# Patient Record
Sex: FEMALE | Race: BLACK OR AFRICAN AMERICAN | Employment: FULL TIME | URBAN - METROPOLITAN AREA
[De-identification: names, ages, dates, MRNs, and addresses within clinical notes are randomized per-mention and may not be internally consistent; named-entity substitution may affect disease eponyms.]

---

## 2017-06-22 ENCOUNTER — OFFICE VISIT (OUTPATIENT)
Dept: FAMILY MEDICINE CLINIC | Age: 47
End: 2017-06-22

## 2017-06-22 ENCOUNTER — HOSPITAL ENCOUNTER (OUTPATIENT)
Dept: LAB | Age: 47
Discharge: HOME | End: 2017-06-22

## 2017-06-22 VITALS
OXYGEN SATURATION: 98 % | TEMPERATURE: 98 F | HEART RATE: 91 BPM | BODY MASS INDEX: 41.15 KG/M2 | WEIGHT: 241 LBS | RESPIRATION RATE: 18 BRPM | SYSTOLIC BLOOD PRESSURE: 132 MMHG | DIASTOLIC BLOOD PRESSURE: 82 MMHG | HEIGHT: 64 IN

## 2017-06-22 DIAGNOSIS — J45.51 SEVERE PERSISTENT ASTHMA WITH ACUTE EXACERBATION: Primary | ICD-10-CM

## 2017-06-22 DIAGNOSIS — J30.89 ALLERGIC RHINITIS DUE TO OTHER ALLERGIC TRIGGER, UNSPECIFIED RHINITIS SEASONALITY: ICD-10-CM

## 2017-06-22 DIAGNOSIS — Z00.00 ROUTINE PHYSICAL EXAMINATION: ICD-10-CM

## 2017-06-22 LAB
ALBUMIN SERPL BCP-MCNC: 3.6 G/DL (ref 3.4–5)
ALBUMIN/GLOB SERPL: 0.8 {RATIO} (ref 0.8–1.7)
ALP SERPL-CCNC: 125 U/L (ref 45–117)
ALT SERPL-CCNC: 20 U/L (ref 13–56)
ANION GAP BLD CALC-SCNC: 11 MMOL/L (ref 3–18)
AST SERPL W P-5'-P-CCNC: 11 U/L (ref 15–37)
BASOPHILS # BLD AUTO: 0 K/UL (ref 0–0.06)
BASOPHILS # BLD: 0 % (ref 0–2)
BILIRUB SERPL-MCNC: 0.4 MG/DL (ref 0.2–1)
BUN SERPL-MCNC: 6 MG/DL (ref 7–18)
BUN/CREAT SERPL: 7 (ref 12–20)
CALCIUM SERPL-MCNC: 9.1 MG/DL (ref 8.5–10.1)
CHLORIDE SERPL-SCNC: 99 MMOL/L (ref 100–108)
CHOLEST SERPL-MCNC: 213 MG/DL
CO2 SERPL-SCNC: 25 MMOL/L (ref 21–32)
CREAT SERPL-MCNC: 0.89 MG/DL (ref 0.6–1.3)
DIFFERENTIAL METHOD BLD: ABNORMAL
EOSINOPHIL # BLD: 0.7 K/UL (ref 0–0.4)
EOSINOPHIL NFR BLD: 4 % (ref 0–5)
ERYTHROCYTE [DISTWIDTH] IN BLOOD BY AUTOMATED COUNT: 15.2 % (ref 11.6–14.5)
EST. AVERAGE GLUCOSE BLD GHB EST-MCNC: 217 MG/DL
GLOBULIN SER CALC-MCNC: 4.7 G/DL (ref 2–4)
GLUCOSE SERPL-MCNC: 289 MG/DL (ref 74–99)
HBA1C MFR BLD: 9.2 % (ref 4.2–5.6)
HCT VFR BLD AUTO: 38.2 % (ref 35–45)
HDLC SERPL-MCNC: 52 MG/DL (ref 40–60)
HDLC SERPL: 4.1 {RATIO} (ref 0–5)
HGB BLD-MCNC: 12.5 G/DL (ref 12–16)
LDLC SERPL CALC-MCNC: 129 MG/DL (ref 0–100)
LIPID PROFILE,FLP: ABNORMAL
LYMPHOCYTES # BLD AUTO: 17 % (ref 21–52)
LYMPHOCYTES # BLD: 2.8 K/UL (ref 0.9–3.6)
MCH RBC QN AUTO: 24.5 PG (ref 24–34)
MCHC RBC AUTO-ENTMCNC: 32.7 G/DL (ref 31–37)
MCV RBC AUTO: 74.9 FL (ref 74–97)
MONOCYTES # BLD: 1.1 K/UL (ref 0.05–1.2)
MONOCYTES NFR BLD AUTO: 7 % (ref 3–10)
NEUTS SEG # BLD: 11.7 K/UL (ref 1.8–8)
NEUTS SEG NFR BLD AUTO: 72 % (ref 40–73)
PLATELET # BLD AUTO: 329 K/UL (ref 135–420)
PMV BLD AUTO: 12.1 FL (ref 9.2–11.8)
POTASSIUM SERPL-SCNC: 4.1 MMOL/L (ref 3.5–5.5)
PROT SERPL-MCNC: 8.3 G/DL (ref 6.4–8.2)
RBC # BLD AUTO: 5.1 M/UL (ref 4.2–5.3)
SODIUM SERPL-SCNC: 135 MMOL/L (ref 136–145)
TRIGL SERPL-MCNC: 160 MG/DL (ref ?–150)
TSH SERPL DL<=0.05 MIU/L-ACNC: 2.65 UIU/ML (ref 0.36–3.74)
VLDLC SERPL CALC-MCNC: 32 MG/DL
WBC # BLD AUTO: 16.4 K/UL (ref 4.6–13.2)

## 2017-06-22 PROCEDURE — 85025 COMPLETE CBC W/AUTO DIFF WBC: CPT | Performed by: NURSE PRACTITIONER

## 2017-06-22 PROCEDURE — 80061 LIPID PANEL: CPT | Performed by: NURSE PRACTITIONER

## 2017-06-22 PROCEDURE — 80053 COMPREHEN METABOLIC PANEL: CPT | Performed by: NURSE PRACTITIONER

## 2017-06-22 PROCEDURE — 83036 HEMOGLOBIN GLYCOSYLATED A1C: CPT | Performed by: NURSE PRACTITIONER

## 2017-06-22 PROCEDURE — 84443 ASSAY THYROID STIM HORMONE: CPT | Performed by: NURSE PRACTITIONER

## 2017-06-22 RX ORDER — ALBUTEROL SULFATE 0.83 MG/ML
1.25 SOLUTION RESPIRATORY (INHALATION)
Qty: 24 EACH | Refills: 5 | Status: SHIPPED | OUTPATIENT
Start: 2017-06-22

## 2017-06-22 RX ORDER — AZITHROMYCIN 250 MG/1
TABLET, FILM COATED ORAL
Qty: 6 TAB | Refills: 0 | Status: SHIPPED | OUTPATIENT
Start: 2017-06-22 | End: 2017-06-27

## 2017-06-22 RX ORDER — ALBUTEROL SULFATE 90 UG/1
2 AEROSOL, METERED RESPIRATORY (INHALATION)
Qty: 1 INHALER | Refills: 0 | Status: SHIPPED | COMMUNITY
Start: 2017-06-22 | End: 2017-11-08 | Stop reason: SDUPTHER

## 2017-06-22 RX ORDER — FLUTICASONE PROPIONATE 50 MCG
SPRAY, SUSPENSION (ML) NASAL
Qty: 1 BOTTLE | Refills: 0 | Status: SHIPPED | COMMUNITY
Start: 2017-06-22 | End: 2017-11-08 | Stop reason: ALTCHOICE

## 2017-06-22 RX ORDER — MONTELUKAST SODIUM 10 MG/1
10 TABLET ORAL DAILY
Qty: 30 TAB | Refills: 11 | Status: SHIPPED | OUTPATIENT
Start: 2017-06-22 | End: 2018-10-30 | Stop reason: SDUPTHER

## 2017-06-22 RX ORDER — METHYLPREDNISOLONE 4 MG/1
TABLET ORAL
Qty: 1 DOSE PACK | Refills: 0 | Status: SHIPPED | COMMUNITY
Start: 2017-06-22 | End: 2022-10-04 | Stop reason: ALTCHOICE

## 2017-06-22 NOTE — MR AVS SNAPSHOT
Visit Information Date & Time Provider Department Dept. Phone Encounter #  
 6/22/2017  9:00 AM Fuad Maxwell Ra [id number] Your Appointments 7/24/2017  2:45 PM  
Follow Up with Fuad Maxwell  [address] ([facility]) Appt Note: f/u appointment [address] [facility] [address] [place]  
  
   
 [address] [facility] [address] [id number] Upcoming Health Maintenance Date Due Pneumococcal 19-64 Medium Risk (1 of 1 - PPSV23) 8/19/1991 DTaP/Tdap/Td series (1 - Tdap) 8/19/1993 INFLUENZA AGE 9 TO ADULT 8/1/2017 PAP AKA CERVICAL CYTOLOGY 3/22/2019 Allergies as of 6/22/2017  Review Complete On: 6/22/2017 By: Samy Santamaria Severity Noted Reaction Type Reactions Pcn [Penicillins]  01/20/2012    Hives Current Immunizations  Never Reviewed No immunizations on file. Not reviewed this visit You Were Diagnosed With   
  
 Codes Comments Severe persistent asthma with acute exacerbation    -  Primary ICD-10-CM: J45.51 
ICD-9-CM: 493.92 Allergic rhinitis due to other allergic trigger, unspecified rhinitis seasonality     ICD-10-CM: J30.89 ICD-9-CM: 477.8 Routine physical examination     ICD-10-CM: Z00.00 ICD-9-CM: V70.0 Vitals BP Pulse Temp Resp Height(growth percentile) Weight(growth percentile) 132/82 (BP 1 Location: Left arm, BP Patient Position: Sitting) 91 98 °F (36.7 °C) (Oral) 18 5' 4\" (1.626 m) 241 lb (109.3 kg) LMP SpO2 BMI OB Status Smoking Status 05/28/2017 98% 41.37 kg/m2 Having regular periods Never Smoker Vitals History BMI and BSA Data Body Mass Index Body Surface Area  
 41.37 kg/m 2 2.22 m 2 Preferred Pharmacy Pharmacy Name Phone Irasema Gonzalez [facility], [address] [phone] Your Updated Medication List  
  
   
 This list is accurate as of: 6/22/17  9:55 AM.  Always use your most recent med list.  
  
  
  
  
 ADVAIR DISKUS 250-50 mcg/dose diskus inhaler Generic drug:  fluticasone-salmeterol Take 1 Puff by inhalation every twelve (12) hours. * albuterol 2.5 mg /3 mL (0.083 %) nebulizer solution Commonly known as:  PROVENTIL VENTOLIN  
1.5 mL by Nebulization route every four (4) hours as needed for Wheezing. * albuterol 90 mcg/actuation inhaler Commonly known as:  PROVENTIL HFA, VENTOLIN HFA, PROAIR HFA Take 2 Puffs by inhalation every four (4) hours as needed for Wheezing. * albuterol 90 mcg/actuation inhaler Commonly known as:  PROVENTIL HFA, VENTOLIN HFA, PROAIR HFA Take 1 Puff by inhalation. azithromycin 250 mg tablet Commonly known as:  Devora Royal Take 2 tablets today, then take 1 tablet daily  
  
 fluticasone 50 mcg/actuation nasal spray Commonly known as:  FLONASE  
1 spray each nostril daily  
  
 methylPREDNISolone 4 mg tablet Commonly known as:  Tilman Blew Use as directed * montelukast 10 mg tablet Commonly known as:  SINGULAIR Take 1 Tab by mouth daily. * SINGULAIR 10 mg tablet Generic drug:  montelukast  
Take 10 mg by mouth daily. * Notice: This list has 5 medication(s) that are the same as other medications prescribed for you. Read the directions carefully, and ask your doctor or other care provider to review them with you. Prescriptions Printed Refills  
 azithromycin (ZITHROMAX) 250 mg tablet 0 Sig: Take 2 tablets today, then take 1 tablet daily Class: Print Prescriptions Sent to Pharmacy Refills  
 montelukast (SINGULAIR) 10 mg tablet 11 Sig: Take 1 Tab by mouth daily. Class: Normal  
 Pharmacy: Nancy Duarte [address] [place] [address], [address] [place] [address] Ph #: [phone]  Route: Oral  
 albuterol (PROVENTIL VENTOLIN) 2.5 mg /3 mL (0.083 %) nebulizer solution 5  
 Si.5 mL by Nebulization route every four (4) hours as needed for Wheezing. Class: Normal  
 Pharmacy: Katarzyna Gustafson [address] [facility], [address] [place] [address] #: [phone] Route: Nebulization Patient Instructions The doctor would like you to complete your mammogram and pap screening exams. You may contact [facility] for a  Free Mammogram and Pap Smear. To schedule an appointment call  [phone]  and you will be referred to the [facility] [address] [facility]  nearest you. At your appointment provide the  [address] [place] [address] as your primary care physician and they will forward your test results for inclusion in your medical records Introducing [facility]! Dear Jose Parrish: 
Thank you for requesting a [employer] account. Our records indicate that you already have an active [employer] account. You can access your account anytime at [email or web address] Did you know that you can access your hospital and ER discharge instructions at any time in [employer]? You can also review all of your test results from your hospital stay or ER visit. Additional Information If you have questions, please visit the Frequently Asked Questions section of the [employer] website at [email or web address] Remember, [employer] is NOT to be used for urgent needs. For medical emergencies, dial 911. Now available from your iPhone and Android! Please provide this summary of care documentation to your next provider. Your primary care clinician is listed as [address]. If you have any questions after today's visit, please call [phone].

## 2017-06-22 NOTE — PATIENT INSTRUCTIONS
The doctor would like you to complete your mammogram and pap screening exams. You may contact [facility] for a  Free Mammogram and Pap Smear. To schedule an appointment call  [phone]  and you will be referred to the [facility] [address] [facility]  nearest you.     At your appointment provide the  [address] [place] [address] as your primary care physician and they will forward your test results for inclusion in your medical records

## 2017-06-22 NOTE — PROGRESS NOTES
No chief complaint on file. 1. When and where did you last receive medical care? 3/22/16 [facility]. Dr. Mckayla Sifuentes    2. When and where did you last have preventive care such as mammogram, pap smears or colon screening? 3/22/2016 PAP and MAMMO -Colon Screening 12/2011    3. What is your current living situation (for example, live alone, live in home with immediate family members)? Lives with mother    4. Do you have any problems with communication such trouble seeing, hearing, or understanding instructions? No    5. Do you have an advance directive? This is a document that you can give to family members with instructions for how you would want them to make health care decisions for you if you were unable to speak for yourself. (For example, unconscious, delerious)No    PMH/FH/Social Hx reviewed and updated as needed     Applicable screenings reviewed and updated as needed  Medication reconciliation performed. Patient does not know need medication refills. Health Maintenance reviewed.

## 2017-06-22 NOTE — PROGRESS NOTES
Gave AVs, sample albuterol, flonase and prednisone. Given PAP application for Albuterol, veramyst and advair Discharge instructions reviewed with patient    Medication list and understanding of medications reviewed with patient. OTC and herbal medications reviewed and added to med list if applicable  Barriers to adherence assessed. Guidance given regarding new medications this visit, including reason for taking this medicine, and common side effects. Labs drawn.

## 2017-06-22 NOTE — PROGRESS NOTES
ROMAIN Hall is a 42 y.o. female being seen today for to establish care. .  she states that she has been coughing a lot and feels much better when she takes advair, singular and albuterol. Doesn't feel unwell but coughing thick mucous throughout the day. Past Medical History:   Diagnosis Date    Asthma          ROS  Patient states that she is feeling well. Denies complaints of chest pain, shortness of breath, swelling of legs, dizziness or weakness. she denies nausea, vomiting or diarrhea. Current Outpatient Prescriptions   Medication Sig    azithromycin (ZITHROMAX) 250 mg tablet Take 2 tablets today, then take 1 tablet daily    montelukast (SINGULAIR) 10 mg tablet Take 1 Tab by mouth daily.  albuterol (PROVENTIL VENTOLIN) 2.5 mg /3 mL (0.083 %) nebulizer solution 1.5 mL by Nebulization route every four (4) hours as needed for Wheezing.  fluticasone (FLONASE) 50 mcg/actuation nasal spray 1 spray each nostril daily    albuterol (PROVENTIL HFA, VENTOLIN HFA, PROAIR HFA) 90 mcg/actuation inhaler Take 2 Puffs by inhalation every four (4) hours as needed for Wheezing.  methylPREDNISolone (MEDROL DOSEPACK) 4 mg tablet Use as directed    montelukast (SINGULAIR) 10 mg tablet Take 10 mg by mouth daily.  albuterol (PROVENTIL HFA, VENTOLIN HFA) 90 mcg/Actuation inhaler Take 1 Puff by inhalation.  fluticasone-salmeterol (ADVAIR DISKUS) 250-50 mcg/dose diskus inhaler Take 1 Puff by inhalation every twelve (12) hours. No current facility-administered medications for this visit. PE  Visit Vitals    /82 (BP 1 Location: Left arm, BP Patient Position: Sitting)    Pulse 91    Temp 98 °F (36.7 °C) (Oral)    Resp 18    Ht 5' 4\" (1.626 m)    Wt 241 lb (109.3 kg)    LMP 05/28/2017    SpO2 98%    BMI 41.37 kg/m2        Alert and oriented with normal mood and affect. she is well developed and well nourished . Lungs with wheezing throughout and upper airway congestion.  Heart rate is regular without murmurs or gallops. There is no lower extremity edema. Assessment and Plan:        ICD-10-CM ICD-9-CM    1. Severe persistent asthma with acute exacerbation J45.51 493.92    2. Allergic rhinitis due to other allergic trigger, unspecified rhinitis seasonality J30.89 477.8    3.  Routine physical examination Z00.00 V70.0 CBC WITH AUTOMATED DIFF      METABOLIC PANEL, COMPREHENSIVE      HEMOGLOBIN A1C WITH EAG      TSH 3RD GENERATION      LIPID PANEL   labs   PAP for advair, albuterol and singulair  F/u prn no improvement        Jayde García, SEMAJ

## 2017-06-29 ENCOUNTER — HOSPITAL ENCOUNTER (OUTPATIENT)
Dept: GENERAL RADIOLOGY | Age: 47
Discharge: HOME | End: 2017-06-29

## 2017-06-29 ENCOUNTER — TELEPHONE (OUTPATIENT)
Dept: FAMILY MEDICINE CLINIC | Age: 47
End: 2017-06-29

## 2017-06-29 DIAGNOSIS — J30.89 ALLERGIC RHINITIS DUE TO OTHER ALLERGIC TRIGGER, UNSPECIFIED RHINITIS SEASONALITY: ICD-10-CM

## 2017-06-29 DIAGNOSIS — J45.51 SEVERE PERSISTENT ASTHMA WITH ACUTE EXACERBATION: ICD-10-CM

## 2017-06-29 PROCEDURE — 71020 XR CHEST PA LAT: CPT

## 2017-06-29 NOTE — TELEPHONE ENCOUNTER
Called pt to let her know that her WBC was elevated and asked if she had any known blood disorders. She said no. Due to her asthma I requested a chest xray and advised that if it came back normal we should recheck the labs in 4-6 weeks and can check her urine at that time. Pt expressed understanding to walk in for chest xray and will follow up if any new symptoms or whatever is suggested by the practitioner.

## 2017-07-24 ENCOUNTER — HOSPITAL ENCOUNTER (OUTPATIENT)
Dept: LAB | Age: 47
Discharge: HOME | End: 2017-07-24

## 2017-07-24 ENCOUNTER — OFFICE VISIT (OUTPATIENT)
Dept: FAMILY MEDICINE CLINIC | Age: 47
End: 2017-07-24

## 2017-07-24 VITALS
DIASTOLIC BLOOD PRESSURE: 84 MMHG | OXYGEN SATURATION: 100 % | TEMPERATURE: 97.9 F | HEIGHT: 64 IN | HEART RATE: 83 BPM | BODY MASS INDEX: 41.83 KG/M2 | RESPIRATION RATE: 16 BRPM | WEIGHT: 245 LBS | SYSTOLIC BLOOD PRESSURE: 120 MMHG

## 2017-07-24 DIAGNOSIS — J45.30 MILD PERSISTENT ASTHMA WITHOUT COMPLICATION: ICD-10-CM

## 2017-07-24 DIAGNOSIS — D72.829 LEUKOCYTOSIS, UNSPECIFIED TYPE: ICD-10-CM

## 2017-07-24 DIAGNOSIS — D72.829 LEUKOCYTOSIS, UNSPECIFIED TYPE: Primary | ICD-10-CM

## 2017-07-24 LAB
BASOPHILS # BLD AUTO: 0 K/UL (ref 0–0.06)
BASOPHILS # BLD: 0 % (ref 0–2)
DIFFERENTIAL METHOD BLD: ABNORMAL
EOSINOPHIL # BLD: 0.6 K/UL (ref 0–0.4)
EOSINOPHIL NFR BLD: 4 % (ref 0–5)
ERYTHROCYTE [DISTWIDTH] IN BLOOD BY AUTOMATED COUNT: 15.4 % (ref 11.6–14.5)
HCT VFR BLD AUTO: 37 % (ref 35–45)
HGB BLD-MCNC: 12.1 G/DL (ref 12–16)
LYMPHOCYTES # BLD AUTO: 27 % (ref 21–52)
LYMPHOCYTES # BLD: 3.7 K/UL (ref 0.9–3.6)
MCH RBC QN AUTO: 24.8 PG (ref 24–34)
MCHC RBC AUTO-ENTMCNC: 32.7 G/DL (ref 31–37)
MCV RBC AUTO: 76 FL (ref 74–97)
MONOCYTES # BLD: 0.8 K/UL (ref 0.05–1.2)
MONOCYTES NFR BLD AUTO: 6 % (ref 3–10)
NEUTS SEG # BLD: 8.8 K/UL (ref 1.8–8)
NEUTS SEG NFR BLD AUTO: 63 % (ref 40–73)
PLATELET # BLD AUTO: 352 K/UL (ref 135–420)
PMV BLD AUTO: 10.9 FL (ref 9.2–11.8)
RBC # BLD AUTO: 4.87 M/UL (ref 4.2–5.3)
WBC # BLD AUTO: 13.9 K/UL (ref 4.6–13.2)

## 2017-07-24 PROCEDURE — 85025 COMPLETE CBC W/AUTO DIFF WBC: CPT | Performed by: NURSE PRACTITIONER

## 2017-07-24 NOTE — PROGRESS NOTES
Discharge instructions reviewed with patient    Medication list and understanding of medications reviewed with patient. OTC and herbal medications reviewed and added to med list if applicable  Barriers to adherence assessed. Guidance given regarding new medications this visit, including reason for taking this medicine, and common side effects. Given PAP application for Albuterol, Advair and Beconase  With instruction. Given AVs after labs drawn.

## 2017-07-24 NOTE — MR AVS SNAPSHOT
Visit Information Date & Time Provider Department Dept. Phone Encounter #  
 7/24/2017  2:45 PM Marty Evans, [address] [facility] [place] [phone] [id number] Upcoming Health Maintenance Date Due Pneumococcal 19-64 Medium Risk (1 of 1 - PPSV23) 8/19/1991 DTaP/Tdap/Td series (1 - Tdap) 8/19/1993 INFLUENZA AGE 9 TO ADULT 8/1/2017 PAP AKA CERVICAL CYTOLOGY 3/22/2019 Allergies as of 7/24/2017  Review Complete On: 7/24/2017 By: Martin Evangelista Severity Noted Reaction Type Reactions Pcn [Penicillins]  01/20/2012    Hives Current Immunizations  Never Reviewed No immunizations on file. Not reviewed this visit You Were Diagnosed With   
  
 Codes Comments Leukocytosis, unspecified type    -  Primary ICD-10-CM: D72.829 ICD-9-CM: 288.60 Vitals BP Pulse Temp Resp Height(growth percentile) Weight(growth percentile) 120/84 (BP 1 Location: Left arm, BP Patient Position: Sitting) 83 97.9 °F (36.6 °C) (Oral) 16 5' 4\" (1.626 m) 245 lb (111.1 kg) LMP SpO2 BMI OB Status Smoking Status 07/17/2017 (Exact Date) 100% 42.05 kg/m2 Having regular periods Never Smoker Vitals History BMI and BSA Data Body Mass Index Body Surface Area 42.05 kg/m 2 2.24 m 2 Preferred Pharmacy Pharmacy Name Phone Alison Bain [address] [facility], [address] [place] [address] [phone] Your Updated Medication List  
  
   
This list is accurate as of: 7/24/17  4:37 PM.  Always use your most recent med list.  
  
  
  
  
 Rona Frias 250-50 mcg/dose diskus inhaler Generic drug:  fluticasone-salmeterol Take 1 Puff by inhalation every twelve (12) hours. * albuterol 2.5 mg /3 mL (0.083 %) nebulizer solution Commonly known as:  PROVENTIL VENTOLIN  
1.5 mL by Nebulization route every four (4) hours as needed for Wheezing. * albuterol 90 mcg/actuation inhaler Commonly known as:  PROVENTIL HFA, VENTOLIN HFA, PROAIR HFA Take 2 Puffs by inhalation every four (4) hours as needed for Wheezing. * albuterol 90 mcg/actuation inhaler Commonly known as:  PROVENTIL HFA, VENTOLIN HFA, PROAIR HFA Take 1 Puff by inhalation. fluticasone 50 mcg/actuation nasal spray Commonly known as:  FLONASE  
1 spray each nostril daily  
  
 methylPREDNISolone 4 mg tablet Commonly known as:  Joselito Midget Use as directed * montelukast 10 mg tablet Commonly known as:  SINGULAIR Take 1 Tab by mouth daily. * SINGULAIR 10 mg tablet Generic drug:  montelukast  
Take 10 mg by mouth daily. * Notice: This list has 5 medication(s) that are the same as other medications prescribed for you. Read the directions carefully, and ask your doctor or other care provider to review them with you. Introducing [facility]! Dear Padmini Wheeler: 
Thank you for requesting a [employer] account. Our records indicate that you already have an active [employer] account. You can access your account anytime at [email or web address]. Did you know that you can access your hospital and ER discharge instructions at any time in [employer]? You can also review all of your test results from your hospital stay or ER visit. Additional Information If you have questions, please visit the Frequently Asked Questions section of the [employer] website at [email or web address]. Remember, [employer] is NOT to be used for urgent needs. For medical emergencies, dial 911. Now available from your iPhone and Android! Please provide this summary of care documentation to your next provider. Your primary care clinician is listed as [address]. If you have any questions after today's visit, please call [phone].

## 2017-07-24 NOTE — PROGRESS NOTES
No chief complaint on file. 1. Have you been to the ER, urgent care clinic since your last visit? Hospitalized since your last visit? No    2. Have you seen or consulted any other health care providers outside of the [address] [place] [address] since your last visit? No    3. When was your last Pap smear? current  When was your last Mammogram? 3/2016 current  When was your last Colon screening? Colon screening -12/2011-    PMH/FH/Social Hx reviewed and updated as needed      Applicable screenings reviewed and updated as needed  Medication reconciliation performed. Patient does not know need medication refills. Health Maintenance reviewed.

## 2017-07-24 NOTE — PROGRESS NOTES
HPI  Alysia Alanis is a 42 y.o. female being seen today for followup. Needs another pap application. Tried prednisone then started zpack when she was not feeling better. Did feel better after zpack. Chief Complaint   Patient presents with    Follow Up Chronic Condition     Asthma monitor and medicate symptoms - patient noted imrpovement   . she states that see HPI    Past Medical History:   Diagnosis Date    Asthma          ROS  Patient states that she is feeling well. Denies complaints of chest pain, shortness of breath, swelling of legs, dizziness or weakness. she denies nausea, vomiting or diarrhea. Current Outpatient Prescriptions   Medication Sig    montelukast (SINGULAIR) 10 mg tablet Take 1 Tab by mouth daily.  albuterol (PROVENTIL VENTOLIN) 2.5 mg /3 mL (0.083 %) nebulizer solution 1.5 mL by Nebulization route every four (4) hours as needed for Wheezing.  fluticasone (FLONASE) 50 mcg/actuation nasal spray 1 spray each nostril daily    albuterol (PROVENTIL HFA, VENTOLIN HFA, PROAIR HFA) 90 mcg/actuation inhaler Take 2 Puffs by inhalation every four (4) hours as needed for Wheezing.  methylPREDNISolone (MEDROL DOSEPACK) 4 mg tablet Use as directed    fluticasone-salmeterol (ADVAIR DISKUS) 250-50 mcg/dose diskus inhaler Take 1 Puff by inhalation every twelve (12) hours.  montelukast (SINGULAIR) 10 mg tablet Take 10 mg by mouth daily.  albuterol (PROVENTIL HFA, VENTOLIN HFA) 90 mcg/Actuation inhaler Take 1 Puff by inhalation. No current facility-administered medications for this visit. PE  Visit Vitals    /84 (BP 1 Location: Left arm, BP Patient Position: Sitting)    Pulse 83    Temp 97.9 °F (36.6 °C) (Oral)    Resp 16    Ht 5' 4\" (1.626 m)    Wt 245 lb (111.1 kg)    LMP 07/17/2017 (Exact Date)    SpO2 100%    BMI 42.05 kg/m2        Alert and oriented with normal mood and affect. she is well developed and well nourished .  Lungs are clear without wheezing. Heart rate is regular without murmurs or gallops. There is no lower extremity edema. Results for orders placed or performed during the hospital encounter of 06/22/17   CBC WITH AUTOMATED DIFF   Result Value Ref Range    WBC 16.4 (H) 4.6 - 13.2 K/uL    RBC 5.10 4.20 - 5.30 M/uL    HGB 12.5 12.0 - 16.0 g/dL    HCT 38.2 35.0 - 45.0 %    MCV 74.9 74.0 - 97.0 FL    MCH 24.5 24.0 - 34.0 PG    MCHC 32.7 31.0 - 37.0 g/dL    RDW 15.2 (H) 11.6 - 14.5 %    PLATELET 964 923 - 837 K/uL    MPV 12.1 (H) 9.2 - 11.8 FL    NEUTROPHILS 72 40 - 73 %    LYMPHOCYTES 17 (L) 21 - 52 %    MONOCYTES 7 3 - 10 %    EOSINOPHILS 4 0 - 5 %    BASOPHILS 0 0 - 2 %    ABS. NEUTROPHILS 11.7 (H) 1.8 - 8.0 K/UL    ABS. LYMPHOCYTES 2.8 0.9 - 3.6 K/UL    ABS. MONOCYTES 1.1 0.05 - 1.2 K/UL    ABS. EOSINOPHILS 0.7 (H) 0.0 - 0.4 K/UL    ABS. BASOPHILS 0.0 0.0 - 0.06 K/UL    DF AUTOMATED     METABOLIC PANEL, COMPREHENSIVE   Result Value Ref Range    Sodium 135 (L) 136 - 145 mmol/L    Potassium 4.1 3.5 - 5.5 mmol/L    Chloride 99 (L) 100 - 108 mmol/L    CO2 25 21 - 32 mmol/L    Anion gap 11 3.0 - 18 mmol/L    Glucose 289 (H) 74 - 99 mg/dL    BUN 6 (L) 7.0 - 18 MG/DL    Creatinine 0.89 0.6 - 1.3 MG/DL    BUN/Creatinine ratio 7 (L) 12 - 20      GFR est AA >60 >60 ml/min/1.73m2    GFR est non-AA >60 >60 ml/min/1.73m2    Calcium 9.1 8.5 - 10.1 MG/DL    Bilirubin, total 0.4 0.2 - 1.0 MG/DL    ALT (SGPT) 20 13 - 56 U/L    AST (SGOT) 11 (L) 15 - 37 U/L    Alk.  phosphatase 125 (H) 45 - 117 U/L    Protein, total 8.3 (H) 6.4 - 8.2 g/dL    Albumin 3.6 3.4 - 5.0 g/dL    Globulin 4.7 (H) 2.0 - 4.0 g/dL    A-G Ratio 0.8 0.8 - 1.7     HEMOGLOBIN A1C WITH EAG   Result Value Ref Range    Hemoglobin A1c 9.2 (H) 4.2 - 5.6 %    Est. average glucose 217 mg/dL   TSH 3RD GENERATION   Result Value Ref Range    TSH 2.65 0.36 - 3.74 uIU/mL   LIPID PANEL   Result Value Ref Range    LIPID PROFILE          Cholesterol, total 213 (H) <200 MG/DL    Triglyceride 160 (H) <150 MG/DL    HDL Cholesterol 52 40 - 60 MG/DL    LDL, calculated 129 (H) 0 - 100 MG/DL    VLDL, calculated 32 MG/DL    CHOL/HDL Ratio 4.1 0 - 5.0           Assessment and Plan:        ICD-10-CM ICD-9-CM    1. Leukocytosis, unspecified type D72.829 288.60 CBC WITH AUTOMATED DIFF   2.  Mild persistent asthma without complication B96.67 406.11      Recheck CBC   PAP application for beconase, albuterol, advair as this helped pt in the past      Chance Montero NP

## 2017-10-23 ENCOUNTER — CLINICAL SUPPORT (OUTPATIENT)
Dept: FAMILY MEDICINE CLINIC | Age: 47
End: 2017-10-23

## 2017-10-23 DIAGNOSIS — Z23 ENCOUNTER FOR IMMUNIZATION: Primary | ICD-10-CM

## 2017-10-23 NOTE — PROGRESS NOTES
Dina Davis is a 41 y.o. female who presents for routine immunizations. She denies any symptoms , reactions or allergies that would exclude them from being immunized today. Risks and adverse reactions were discussed and the VIS was given to them. All questions were addressed. She was observed for 15 min post injection. There were no reactions observed. Alexandro Beltran RN      Thank you for returning your application for medication assistance. We will fax your application to the [facility] prescription , Elzbieta Buckley. It may take anywhere from 3 to 6 weeks for your application to be approved. THE FIRST FILL OF YOUR MEDICINE WILL BE DELIVERED TO THE [facility]. CALL [phone] OR [phone]   TO ASK FOR REFILLS WHEN YOU HAVE ONE MONTH   OF MEDICINE LEFT. Think of it the same way as when you call your regular pharmacy to order your medicine refills.

## 2017-10-25 ENCOUNTER — DOCUMENTATION ONLY (OUTPATIENT)
Dept: FAMILY MEDICINE CLINIC | Age: 47
End: 2017-10-25

## 2017-10-25 NOTE — PROGRESS NOTES
Pharmacy Assistance Program Application received for Advair Diskus 250-50 mcg, Albuterol HFA, and Beconase. Application faxed to [employer] for review. Fax confirmation received.

## 2017-11-08 RX ORDER — FLUTICASONE PROPIONATE AND SALMETEROL 250; 50 UG/1; UG/1
1 POWDER RESPIRATORY (INHALATION) EVERY 12 HOURS
Qty: 3 INHALER | Refills: 3 | Status: SHIPPED | COMMUNITY
Start: 2017-11-08

## 2017-11-08 RX ORDER — ALBUTEROL SULFATE 90 UG/1
2 AEROSOL, METERED RESPIRATORY (INHALATION)
Qty: 3 INHALER | Refills: 3 | Status: SHIPPED | COMMUNITY
Start: 2017-11-08

## 2017-11-08 NOTE — TELEPHONE ENCOUNTER
Received fax appicaton from [employer] and chart notes reviewed. Pharmacy Assistance Medication approved for pickup.     Ventolin 90mcg 2 puff q 4 hour prn  advair 250/50 one puff bid  beconase 42 mcg one spray each nostril bid

## 2017-12-27 ENCOUNTER — TELEPHONE (OUTPATIENT)
Dept: FAMILY MEDICINE CLINIC | Age: 47
End: 2017-12-27

## 2018-01-24 ENCOUNTER — HOSPITAL ENCOUNTER (OUTPATIENT)
Dept: LAB | Age: 48
Discharge: HOME | End: 2018-01-24
Payer: MEDICAID

## 2018-01-24 ENCOUNTER — OFFICE VISIT (OUTPATIENT)
Dept: OBGYN CLINIC | Age: 48
End: 2018-01-24

## 2018-01-24 VITALS
WEIGHT: 243.6 LBS | HEIGHT: 64 IN | DIASTOLIC BLOOD PRESSURE: 66 MMHG | OXYGEN SATURATION: 100 % | TEMPERATURE: 97.4 F | HEART RATE: 71 BPM | BODY MASS INDEX: 41.59 KG/M2 | RESPIRATION RATE: 13 BRPM | SYSTOLIC BLOOD PRESSURE: 116 MMHG

## 2018-01-24 DIAGNOSIS — D25.9 UTERINE LEIOMYOMA, UNSPECIFIED LOCATION: ICD-10-CM

## 2018-01-24 DIAGNOSIS — Z01.419 WELL WOMAN EXAM WITH ROUTINE GYNECOLOGICAL EXAM: ICD-10-CM

## 2018-01-24 DIAGNOSIS — Z01.419 ENCOUNTER FOR WELL WOMAN EXAM WITH ROUTINE GYNECOLOGICAL EXAM: Primary | ICD-10-CM

## 2018-01-24 PROBLEM — E66.01 OBESITY, MORBID (HCC): Status: ACTIVE | Noted: 2018-01-24

## 2018-01-24 PROCEDURE — 88175 CYTOPATH C/V AUTO FLUID REDO: CPT | Performed by: OBSTETRICS & GYNECOLOGY

## 2018-01-24 PROCEDURE — 87491 CHLMYD TRACH DNA AMP PROBE: CPT | Performed by: OBSTETRICS & GYNECOLOGY

## 2018-01-24 PROCEDURE — 87624 HPV HI-RISK TYP POOLED RSLT: CPT | Performed by: OBSTETRICS & GYNECOLOGY

## 2018-01-24 NOTE — PATIENT INSTRUCTIONS
Uterine Fibroids: Care Instructions  Your Care Instructions    Uterine fibroids are growths in the uterus. Fibroids aren't cancer. Doctors don't know what causes fibroids. Fibroids are very common in women during their childbearing years. Fibroids can grow on the inside of the uterus, in the muscle wall of the uterus, or near the outside wall of the uterus. In some women, fibroids cause painful cramps and heavy periods. In these cases, taking anti-inflammatory medicines, birth control pills, or using an intrauterine device (IUD) often helps decrease symptoms. Sometimes surgery is needed to treat fibroids. But if you are near menopause, you may want to wait and see if your symptoms get better. Most fibroids shrink and go away after menopause, when your menstrual periods stop completely. Follow-up care is a key part of your treatment and safety. Be sure to make and go to all appointments, and call your doctor if you are having problems. It's also a good idea to know your test results and keep a list of the medicines you take. How can you care for yourself at home? · If your doctor gave you medicine, take it as exactly as prescribed. Be safe with medicines. Call your doctor if you think you are having a problem with your medicine. · Take anti-inflammatory medicines for pain. These include ibuprofen (Advil, Motrin) and naproxen (Aleve). Read and follow all instructions on the label. · Use heat, such as a hot water bottle or a heating pad set on low, or a warm bath to relax tense muscles and relieve cramping. Put a thin cloth between the heating pad and your skin. Never go to sleep with a heating pad on. · Lie down and put a pillow under your knees. Or, lie on your side and bring your knees up to your chest. These positions may help relieve belly pain or pressure. · Keep track of how many sanitary pads or tampons you use each day. · Get at least 30 minutes of exercise on most days of the week.  Walking is a good choice. You also may want to do other activities, such as running, swimming, cycling, or playing tennis or team sports. · If you bleed longer than usual or have heavy bleeding, take a daily multivitamin with iron. When should you call for help? Call your doctor now or seek immediate medical care if:  ? · You have severe vaginal bleeding. ? · You have new or worse belly or pelvic pain. ? Watch closely for changes in your health, and be sure to contact your doctor if:  ? · You have unusual vaginal bleeding. ? · You do not get better as expected. Where can you learn more? Go to http://mariann-tessa.info/. Enter B121 in the search box to learn more about \"Uterine Fibroids: Care Instructions. \"  Current as of: October 13, 2016  Content Version: 11.4  © 5539-3300 [employer]. Care instructions adapted under license by [employer] (which disclaims liability or warranty for this information). If you have questions about a medical condition or this instruction, always ask your healthcare professional. [place] [address] any warranty or liability for your use of this information. Well Visit, Ages 25 to 48: Care Instructions  Your Care Instructions    Physical exams can help you stay healthy. Your doctor has checked your overall health and may have suggested ways to take good care of yourself. He or she also may have recommended tests. At home, you can help prevent illness with healthy eating, regular exercise, and other steps. Follow-up care is a key part of your treatment and safety. Be sure to make and go to all appointments, and call your doctor if you are having problems. It's also a good idea to know your test results and keep a list of the medicines you take. How can you care for yourself at home? · Reach and stay at a healthy weight.  This will lower your risk for many problems, such as obesity, diabetes, heart disease, and high blood pressure. · Get at least 30 minutes of physical activity on most days of the week. Walking is a good choice. You also may want to do other activities, such as running, swimming, cycling, or playing tennis or team sports. Discuss any changes in your exercise program with your doctor. · Do not smoke or allow others to smoke around you. If you need help quitting, talk to your doctor about stop-smoking programs and medicines. These can increase your chances of quitting for good. · Talk to your doctor about whether you have any risk factors for sexually transmitted infections (STIs). Having one sex partner (who does not have STIs and does not have sex with anyone else) is a good way to avoid these infections. · Use birth control if you do not want to have children at this time. Talk with your doctor about the choices available and what might be best for you. · Protect your skin from too much sun. When you're outdoors from 10 a.m. to 4 p.m., stay in the shade or cover up with clothing and a hat with a wide brim. Wear sunglasses that block UV rays. Even when it's cloudy, put broad-spectrum sunscreen (SPF 30 or higher) on any exposed skin. · See a dentist one or two times a year for checkups and to have your teeth cleaned. · Wear a seat belt in the car. · Drink alcohol in moderation, if at all. That means no more than 2 drinks a day for men and 1 drink a day for women. Follow your doctor's advice about when to have certain tests. These tests can spot problems early. For everyone  · Cholesterol. Have the fat (cholesterol) in your blood tested after age 21. Your doctor will tell you how often to have this done based on your age, family history, or other things that can increase your risk for heart disease. · Blood pressure. Have your blood pressure checked during a routine doctor visit.  Your doctor will tell you how often to check your blood pressure based on your age, your blood pressure results, and other factors. · Vision. Talk with your doctor about how often to have a glaucoma test.  · Diabetes. Ask your doctor whether you should have tests for diabetes. · Colon cancer. Have a test for colon cancer at age 48. You may have one of several tests. If you are younger than 48, you may need a test earlier if you have any risk factors. Risk factors include whether you already had a precancerous polyp removed from your colon or whether your parent, brother, sister, or child has had colon cancer. For women  · Breast exam and mammogram. Talk to your doctor about when you should have a clinical breast exam and a mammogram. Medical experts differ on whether and how often women under 50 should have these tests. Your doctor can help you decide what is right for you. · Pap test and pelvic exam. Begin Pap tests at age 24. A Pap test is the best way to find cervical cancer. The test often is part of a pelvic exam. Ask how often to have this test.  · Tests for sexually transmitted infections (STIs). Ask whether you should have tests for STIs. You may be at risk if you have sex with more than one person, especially if your partners do not wear condoms. For men  · Tests for sexually transmitted infections (STIs). Ask whether you should have tests for STIs. You may be at risk if you have sex with more than one person, especially if you do not wear a condom. · Testicular cancer exam. Ask your doctor whether you should check your testicles regularly. · Prostate exam. Talk to your doctor about whether you should have a blood test (called a PSA test) for prostate cancer. Experts differ on whether and when men should have this test. Some experts suggest it if you are older than 39 and are -American or have a father or brother who got prostate cancer when he was younger than 72. When should you call for help?   Watch closely for changes in your health, and be sure to contact your doctor if you have any problems or symptoms that concern you. Where can you learn more? Go to http://mariann-tessa.info/. Enter P072 in the search box to learn more about \"Well Visit, Ages 25 to 48: Care Instructions. \"  Current as of: May 12, 2017  Content Version: 11.4  © 2283-5478 Healthwise, Incorporated. Care instructions adapted under license by [employer] (which disclaims liability or warranty for this information). If you have questions about a medical condition or this instruction, always ask your healthcare professional. [place] [address] any warranty or liability for your use of this information.

## 2018-01-24 NOTE — MR AVS SNAPSHOT
[address] [facility] [address] [address] [address] [facility] 
[phone] Patient: Farhad Shields MRN: [id number] Melanie Truong Visit Information Date & Time Provider Department Dept. Phone Encounter #  
 1/24/2018 11:30 AM [facility] [id number] Follow-up Instructions Return in about 1 year (around 1/24/2019). Upcoming Health Maintenance Date Due  
 PAP AKA CERVICAL CYTOLOGY 3/22/2019 Allergies as of 1/24/2018  Review Complete On: 1/24/2018 By: Abdifatah Anaya MD  
  
 Severity Noted Reaction Type Reactions Pcn [Penicillins]  01/20/2012    Hives Current Immunizations  Never Reviewed Name Date Influenza Vaccine (Quad) PF 10/23/2017 Not reviewed this visit You Were Diagnosed With   
  
 Codes Comments Encounter for well woman exam with routine gynecological exam    -  Primary ICD-10-CM: D27.078 ICD-9-CM: V72.31 Well woman exam with routine gynecological exam     ICD-10-CM: V47.830 ICD-9-CM: V72.31 [V72.31] Uterine leiomyoma, unspecified location     ICD-10-CM: D25.9 ICD-9-CM: 218.9 Vitals BP Pulse Temp Resp Height(growth percentile) Weight(growth percentile) 116/66 (BP 1 Location: Left arm, BP Patient Position: Sitting) 71 97.4 °F (36.3 °C) (Oral) 13 5' 4\" (1.626 m) 243 lb 9.6 oz (110.5 kg) SpO2 BMI OB Status Smoking Status 100% 41.81 kg/m2 Having regular periods Never Smoker BMI and BSA Data Body Mass Index Body Surface Area  
 41.81 kg/m 2 2.23 m 2 Preferred Pharmacy Pharmacy Name Phone [facility] [address], [address] [phone] Your Updated Medication List  
  
   
This list is accurate as of: 1/24/18 12:04 PM.  Always use your most recent med list.  
  
  
  
  
 * albuterol 2.5 mg /3 mL (0.083 %) nebulizer solution Commonly known as:  PROVENTIL VENTOLIN  
 1.5 mL by Nebulization route every four (4) hours as needed for Wheezing. * albuterol 90 mcg/actuation inhaler Commonly known as:  PROVENTIL HFA, VENTOLIN HFA, PROAIR HFA Take 2 Puffs by inhalation every six (6) hours as needed for Wheezing. beclomethasone 42 mcg (0.042 %) nasal spray Commonly known as:  BECONASE AQ  
1 Puff by Both Nostrils route two (2) times a day. fluticasone-salmeterol 250-50 mcg/dose diskus inhaler Commonly known as:  ADVAIR DISKUS Take 1 Puff by inhalation every twelve (12) hours. methylPREDNISolone 4 mg tablet Commonly known as:  Jason Archibald Use as directed * montelukast 10 mg tablet Commonly known as:  SINGULAIR Take 1 Tab by mouth daily. * SINGULAIR 10 mg tablet Generic drug:  montelukast  
Take 10 mg by mouth daily. * Notice: This list has 4 medication(s) that are the same as other medications prescribed for you. Read the directions carefully, and ask your doctor or other care provider to review them with you. Follow-up Instructions Return in about 1 year (around 1/24/2019). To-Do List   
 01/24/2018 Imaging:  [facility] M[facility] BI SCREENING INCL CAD   
  
 01/24/2018 Imaging:  US PELV NON OBS Patient Instructions Uterine Fibroids: Care Instructions Your Care Instructions Uterine fibroids are growths in the uterus. Fibroids aren't cancer. Doctors don't know what causes fibroids. Fibroids are very common in women during their childbearing years. Fibroids can grow on the inside of the uterus, in the muscle wall of the uterus, or near the outside wall of the uterus. In some women, fibroids cause painful cramps and heavy periods. In these cases, taking anti-inflammatory medicines, birth control pills, or using an intrauterine device (IUD) often helps decrease symptoms. Sometimes surgery is needed to treat fibroids.  But if you are near menopause, you may want to wait and see if your symptoms get better. Most fibroids shrink and go away after menopause, when your menstrual periods stop completely. Follow-up care is a key part of your treatment and safety. Be sure to make and go to all appointments, and call your doctor if you are having problems. It's also a good idea to know your test results and keep a list of the medicines you take. How can you care for yourself at home? · If your doctor gave you medicine, take it as exactly as prescribed. Be safe with medicines. Call your doctor if you think you are having a problem with your medicine. · Take anti-inflammatory medicines for pain. These include ibuprofen (Advil, Motrin) and naproxen (Aleve). Read and follow all instructions on the label. · Use heat, such as a hot water bottle or a heating pad set on low, or a warm bath to relax tense muscles and relieve cramping. Put a thin cloth between the heating pad and your skin. Never go to sleep with a heating pad on. · Lie down and put a pillow under your knees. Or, lie on your side and bring your knees up to your chest. These positions may help relieve belly pain or pressure. · Keep track of how many sanitary pads or tampons you use each day. · Get at least 30 minutes of exercise on most days of the week. Walking is a good choice. You also may want to do other activities, such as running, swimming, cycling, or playing tennis or team sports. · If you bleed longer than usual or have heavy bleeding, take a daily multivitamin with iron. When should you call for help? Call your doctor now or seek immediate medical care if: 
? · You have severe vaginal bleeding. ? · You have new or worse belly or pelvic pain. ? Watch closely for changes in your health, and be sure to contact your doctor if: 
? · You have unusual vaginal bleeding. ? · You do not get better as expected. Where can you learn more? Go to http://mariann-tessa.info/. Enter B121 in the search box to learn more about \"Uterine Fibroids: Care Instructions. \" Current as of: October 13, 2016 Content Version: 11.4 © 3086-6816 [employer]. Care instructions adapted under license by [employer] (which disclaims liability or warranty for this information). If you have questions about a medical condition or this instruction, always ask your healthcare professional. [address] any warranty or liability for your use of this information. Well Visit, Ages 25 to 48: Care Instructions Your Care Instructions Physical exams can help you stay healthy. Your doctor has checked your overall health and may have suggested ways to take good care of yourself. He or she also may have recommended tests. At home, you can help prevent illness with healthy eating, regular exercise, and other steps. Follow-up care is a key part of your treatment and safety. Be sure to make and go to all appointments, and call your doctor if you are having problems. It's also a good idea to know your test results and keep a list of the medicines you take. How can you care for yourself at home? · Reach and stay at a healthy weight. This will lower your risk for many problems, such as obesity, diabetes, heart disease, and high blood pressure. · Get at least 30 minutes of physical activity on most days of the week. Walking is a good choice. You also may want to do other activities, such as running, swimming, cycling, or playing tennis or team sports. Discuss any changes in your exercise program with your doctor. · Do not smoke or allow others to smoke around you. If you need help quitting, talk to your doctor about stop-smoking programs and medicines. These can increase your chances of quitting for good. · Talk to your doctor about whether you have any risk factors for sexually transmitted infections (STIs).  Having one sex partner (who does not have STIs and does not have sex with anyone else) is a good way to avoid these infections. · Use birth control if you do not want to have children at this time. Talk with your doctor about the choices available and what might be best for you. · Protect your skin from too much sun. When you're outdoors from 10 a.m. to 4 p.m., stay in the shade or cover up with clothing and a hat with a wide brim. Wear sunglasses that block UV rays. Even when it's cloudy, put broad-spectrum sunscreen (SPF 30 or higher) on any exposed skin. · See a dentist one or two times a year for checkups and to have your teeth cleaned. · Wear a seat belt in the car. · Drink alcohol in moderation, if at all. That means no more than 2 drinks a day for men and 1 drink a day for women. Follow your doctor's advice about when to have certain tests. These tests can spot problems early. For everyone · Cholesterol. Have the fat (cholesterol) in your blood tested after age 21. Your doctor will tell you how often to have this done based on your age, family history, or other things that can increase your risk for heart disease. · Blood pressure. Have your blood pressure checked during a routine doctor visit. Your doctor will tell you how often to check your blood pressure based on your age, your blood pressure results, and other factors. · Vision. Talk with your doctor about how often to have a glaucoma test. 
· Diabetes. Ask your doctor whether you should have tests for diabetes. · Colon cancer. Have a test for colon cancer at age 48. You may have one of several tests. If you are younger than 48, you may need a test earlier if you have any risk factors. Risk factors include whether you already had a precancerous polyp removed from your colon or whether your parent, brother, sister, or child has had colon cancer. For women · Breast exam and mammogram. Talk to your doctor about when you should have a clinical breast exam and a mammogram. Medical experts differ on whether and how often women under 50 should have these tests. Your doctor can help you decide what is right for you. · Pap test and pelvic exam. Begin Pap tests at age 24. A Pap test is the best way to find cervical cancer. The test often is part of a pelvic exam. Ask how often to have this test. 
· Tests for sexually transmitted infections (STIs). Ask whether you should have tests for STIs. You may be at risk if you have sex with more than one person, especially if your partners do not wear condoms. For men · Tests for sexually transmitted infections (STIs). Ask whether you should have tests for STIs. You may be at risk if you have sex with more than one person, especially if you do not wear a condom. · Testicular cancer exam. Ask your doctor whether you should check your testicles regularly. · Prostate exam. Talk to your doctor about whether you should have a blood test (called a PSA test) for prostate cancer. Experts differ on whether and when men should have this test. Some experts suggest it if you are older than 39 and are -American or have a father or brother who got prostate cancer when he was younger than 72. When should you call for help? Watch closely for changes in your health, and be sure to contact your doctor if you have any problems or symptoms that concern you. Where can you learn more? Go to http://mariann-tessa.info/. Enter P072 in the search box to learn more about \"Well Visit, Ages 25 to 48: Care Instructions. \" Current as of: May 12, 2017 Content Version: 11.4 © 8129-5326 Healthwise, Incorporated. Care instructions adapted under license by [email or web address] (which disclaims liability or warranty for this information).  If you have questions about a medical condition or this instruction, always ask your healthcare professional. [employer] disclaims any warranty or liability for your use of this information. Introducing [facility]! Dear Loretta Alvarado: 
Thank you for requesting a C[employer]ang account. Our records indicate that you already have an active Coupang account. You can access your account anytime at [email or web address] Did you know that you can access your hospital and ER discharge instructions at any time in C[employer]ang? You can also review all of your test results from your hospital stay or ER visit. Additional Information If you have questions, please visit the Frequently Asked Questions section of the [employer]ang website at [email or web address] Remember, Coupang is NOT to be used for urgent needs. For medical emergencies, dial 911. Now available from your iPhone and Android! Please provide this summary of care documentation to your next provider. Your primary care clinician is listed as [phone] y [phone]. If you have any questions after today's visit, please call [phone].

## 2018-01-24 NOTE — PROGRESS NOTES
Subjective:   41 y.o. female for Well Woman Check. No LMP recorded. Unknown LNMP    Social History: single partner, contraception - essure. Pertinent past medical hstory: no history of HTN, DVT, CAD, DM, liver disease, migraines or smoking. ROS:  Feeling well. No dyspnea or chest pain on exertion. No abdominal pain, change in bowel habits, black or bloody stools. No urinary tract symptoms. GYN ROS: no breast pain or new or enlarging lumps on self exam, she complains of menorrhagia. No neurological complaints. Objective:     Visit Vitals    /66 (BP 1 Location: Left arm, BP Patient Position: Sitting)    Pulse 71    Temp 97.4 °F (36.3 °C) (Oral)    Resp 13    Ht 5' 4\" (1.626 m)    Wt 243 lb 9.6 oz (110.5 kg)    SpO2 100%    BMI 41.81 kg/m2     The patient appears well, alert, oriented x 3, in no distress. ENT normal.  Neck supple. No adenopathy or thyromegaly. KAIN. Lungs are clear, good air entry, no wheezes, rhonchi or rales. S1 and S2 normal, no murmurs, regular rate and rhythm. Abdomen soft without tenderness, guarding, mass or organomegaly. Extremities show no edema, normal peripheral pulses. Neurological is normal, no focal findings. BREAST EXAM: breasts appear normal, no suspicious masses, no skin or nipple changes or axillary nodes    PELVIC EXAM: VULVA: normal appearing vulva with no masses, tenderness or lesions, VAGINA: normal appearing vagina with normal color and discharge, no lesions, CERVIX: normal appearing cervix without discharge or lesions, ADNEXA: normal adnexa in size, nontender and no masses, UTERUS--irregular and enlarged. Assessment/Plan:   well woman  Fibroids. mammogram  pap smear  return annually or prn    ICD-10-CM ICD-9-CM    1. Encounter for well woman exam with routine gynecological exam Z01.419 V72.31 PAP IG, CT-NG TV HPV-HR ([id number], [id number])      [facility] MAMMO BI SCREENING INCL CAD   2.  Well woman exam with routine gynecological exam Z01.419 V72.31 CANELO MAMMO BI SCREENING INCL CAD    [V72.31]   .

## 2018-01-25 LAB
C TRACH RRNA SPEC QL NAA+PROBE: NEGATIVE
N GONORRHOEA RRNA SPEC QL NAA+PROBE: NEGATIVE
SPECIMEN SOURCE: NORMAL
T VAGINALIS RRNA SPEC QL NAA+PROBE: NEGATIVE

## 2018-01-26 DIAGNOSIS — N76.0 BV (BACTERIAL VAGINOSIS): Primary | ICD-10-CM

## 2018-01-26 DIAGNOSIS — B96.89 BV (BACTERIAL VAGINOSIS): Primary | ICD-10-CM

## 2018-01-26 RX ORDER — METRONIDAZOLE 500 MG/1
500 TABLET ORAL 3 TIMES DAILY
Qty: 30 TAB | Refills: 4 | Status: SHIPPED | OUTPATIENT
Start: 2018-01-26 | End: 2018-02-05

## 2018-02-06 ENCOUNTER — HOSPITAL ENCOUNTER (OUTPATIENT)
Dept: MAMMOGRAPHY | Age: 48
Discharge: HOME | End: 2018-02-06
Attending: OBSTETRICS & GYNECOLOGY
Payer: MEDICAID

## 2018-02-06 ENCOUNTER — HOSPITAL ENCOUNTER (OUTPATIENT)
Dept: ULTRASOUND IMAGING | Age: 48
Discharge: HOME | End: 2018-02-06
Attending: OBSTETRICS & GYNECOLOGY
Payer: MEDICAID

## 2018-02-06 DIAGNOSIS — Z01.419 WELL WOMAN EXAM WITH ROUTINE GYNECOLOGICAL EXAM: ICD-10-CM

## 2018-02-06 DIAGNOSIS — D25.9 UTERINE LEIOMYOMA, UNSPECIFIED LOCATION: ICD-10-CM

## 2018-02-06 DIAGNOSIS — Z01.419 ENCOUNTER FOR WELL WOMAN EXAM WITH ROUTINE GYNECOLOGICAL EXAM: ICD-10-CM

## 2018-02-06 PROCEDURE — 77067 SCR MAMMO BI INCL CAD: CPT

## 2018-02-06 PROCEDURE — 76856 US EXAM PELVIC COMPLETE: CPT

## 2018-10-30 NOTE — TELEPHONE ENCOUNTER
Patient name verified on voicemail. Attempted to call patient to schedule patient an follow up appointment.

## 2018-11-01 RX ORDER — MONTELUKAST SODIUM 10 MG/1
10 TABLET ORAL DAILY
Qty: 30 TAB | Refills: 2 | Status: SHIPPED | OUTPATIENT
Start: 2018-11-01

## 2019-02-04 RX ORDER — MONTELUKAST SODIUM 10 MG/1
TABLET ORAL
Qty: 30 TAB | Refills: 5 | OUTPATIENT
Start: 2019-02-04

## 2019-04-04 ENCOUNTER — HOSPITAL ENCOUNTER (OUTPATIENT)
Dept: MAMMOGRAPHY | Age: 49
Discharge: HOME | End: 2019-04-04
Attending: FAMILY MEDICINE
Payer: MEDICAID

## 2019-04-04 DIAGNOSIS — Z12.31 VISIT FOR SCREENING MAMMOGRAM: ICD-10-CM

## 2019-04-04 PROCEDURE — 77067 SCR MAMMO BI INCL CAD: CPT

## 2019-05-21 ENCOUNTER — OFFICE VISIT (OUTPATIENT)
Dept: ORTHOPEDIC SURGERY | Facility: CLINIC | Age: 49
End: 2019-05-21

## 2019-05-21 VITALS
SYSTOLIC BLOOD PRESSURE: 131 MMHG | HEIGHT: 64 IN | WEIGHT: 220.6 LBS | HEART RATE: 68 BPM | TEMPERATURE: 96.2 F | RESPIRATION RATE: 16 BRPM | OXYGEN SATURATION: 97 % | DIASTOLIC BLOOD PRESSURE: 86 MMHG | BODY MASS INDEX: 37.66 KG/M2

## 2019-05-21 DIAGNOSIS — M19.90 INFLAMMATORY ARTHROPATHY: ICD-10-CM

## 2019-05-21 DIAGNOSIS — M79.642 LEFT HAND PAIN: ICD-10-CM

## 2019-05-21 DIAGNOSIS — M77.8 HAND TENDONITIS: Primary | ICD-10-CM

## 2019-05-21 NOTE — PROGRESS NOTES
Luiz Rivero is a 57 y.o. female right handed . Worker's Compensation and legal considerations: not known. Vitals:    19 1544   BP: 131/86   Pulse: 68   Resp: 16   Temp: 96.2 °F (35.7 °C)   TempSrc: Oral   SpO2: 97%   Weight: 220 lb 9.6 oz (100.1 kg)   Height: 5' 4\" (1.626 m)   PainSc:   7   PainLoc: Wrist           Chief Complaint   Patient presents with    Wrist Pain     Left wrist pain          HPI: Patient comes in today with complaints of left hand pain. She reports this is been going on for couple weeks. She localizes the pain to the dorsal first webspace as well as the dorsum of the second and third metacarpals. She denies any specific injuries. She is unable to determine if it is worse in the morning or afternoon. Date of onset: 2019 approximately    Injury: No    Prior Treatment:  No    Numbness/ Tingling: No    ROS: Review of Systems - General ROS: negative  Respiratory ROS: no cough, shortness of breath, or wheezing  Cardiovascular ROS: no chest pain or dyspnea on exertion  Musculoskeletal ROS: positive for - pain in hand - left  Neurological ROS: negative  Dermatological ROS: negative    Past Medical History:   Diagnosis Date    Asthma        Past Surgical History:   Procedure Laterality Date    HX  SECTION      HX CHOLECYSTECTOMY      HX GYN  2010    essure    HX HEENT      sinus surgery    MYOMECTOMY 1-4,W/TOT 250GMS/<,ABD APPRCH  , , 2007    x3    REMOVAL GALLBLADDER  1998       Current Outpatient Medications   Medication Sig Dispense Refill    montelukast (SINGULAIR) 10 mg tablet Take 1 Tab by mouth daily. 30 Tab 2    albuterol (PROVENTIL HFA, VENTOLIN HFA, PROAIR HFA) 90 mcg/actuation inhaler Take 2 Puffs by inhalation every six (6) hours as needed for Wheezing. 3 Inhaler 3    fluticasone-salmeterol (ADVAIR DISKUS) 250-50 mcg/dose diskus inhaler Take 1 Puff by inhalation every twelve (12) hours.  3 Inhaler 3    beclomethasone (BECONASE AQ) 42 mcg (0.042 %) nasal spray 1 Puff by Both Nostrils route two (2) times a day. 30 g 3    albuterol (PROVENTIL VENTOLIN) 2.5 mg /3 mL (0.083 %) nebulizer solution 1.5 mL by Nebulization route every four (4) hours as needed for Wheezing. 24 Each 5    methylPREDNISolone (MEDROL DOSEPACK) 4 mg tablet Use as directed 1 Dose Pack 0       Allergies   Allergen Reactions    Pcn [Penicillins] Hives         PE:     Left Hand: There is tenderness to palpation localized over the dorsum of the second and third metacarpals. There is pain with resistance of the index and long fingers in extension. This is worse in the index finger than the long finger. There is also pain with passive flexion of the long finger and index finger. Imaging:   3 views of Left hand does not show any acute fracture dislocation or other osseous abnormality. There is minimal to no degenerative changes noted in the wrist and hand. ICD-10-CM ICD-9-CM    1. Hand tendonitis M77.9 727.05 AMB SUPPLY ORDER   2. Inflammatory arthropathy M19.90 714.9 C REACTIVE PROTEIN, QT      CYCLIC CITRUL PEPTIDE AB, IGG      RHEUMATOID FACTOR, QT      SED RATE (ESR)   3. Left hand pain M79.642 729.5 AMB POC XRAY, HAND; 3+ VIEWS       Plan:     The patient likely has a tendinitis of her extensor tendons were index finger and long finger. Given the possible family history of rheumatoid arthritis, we will get the patient tested for rheumatoid arthritis with a rheumatoid panel. I have also instructed the patient to wear a TKO in the index and middle fingers to allow for the tendons to rest for 3 to 4 weeks. I told her at this point to slowly decrease wear and to see how she is doing at 6 weeks without wearing her brace.      Follow-up PRN    Plan was reviewed with patient, who verbalized agreement and understanding of the plan

## 2019-05-23 ENCOUNTER — OFFICE VISIT (OUTPATIENT)
Dept: OBGYN CLINIC | Age: 49
End: 2019-05-23

## 2019-05-23 VITALS
SYSTOLIC BLOOD PRESSURE: 138 MMHG | RESPIRATION RATE: 18 BRPM | TEMPERATURE: 98.4 F | WEIGHT: 223.6 LBS | BODY MASS INDEX: 38.17 KG/M2 | HEIGHT: 64 IN | OXYGEN SATURATION: 100 % | HEART RATE: 85 BPM | DIASTOLIC BLOOD PRESSURE: 89 MMHG

## 2019-05-23 DIAGNOSIS — R10.2 PELVIC PAIN IN FEMALE: Primary | ICD-10-CM

## 2019-05-23 DIAGNOSIS — N93.8 DUB (DYSFUNCTIONAL UTERINE BLEEDING): ICD-10-CM

## 2019-05-23 NOTE — PROGRESS NOTES
Progress Note    Patient: Kailash Castañeda  MRN: [id number]  Date: 5/25/2019     Age:  57 y.o.,      Sex: female    YOB: 1970    Kailash Castañeda is a 57y.o. year-old female, G 2 P 1 whose last normal menstrual period is unknown. She had an essure in 2010. She presents today with complaints of menorrhagia and severe dysmenorrhea. The pelvic pain is getting more intense but she is not taking any analgesics. She wants to have possible gyn procedure to correct the menorrhagia. Chief Complaint   Patient presents with    Pelvic Pain     severe for 6 months during cycle time and during ovulation has Essure in place for 9 years    Menstrual Problem     painful cycles   . Review of Systems: General, Cardiovascular, Respiratory, Gastrointestinal, Genitourinary, Neuropsychiatry, Musculoskeletal.  Patient denies any problems associated with these systems except for the above mentioned problems. General Examination: She is a well-developed, well-nourished female in no acute distress. Abdomen--soft, nontender, and normal active. Pelvic exam--External genitalia and BUS--normal.             Cervix: Normal    Vagina: vaginal discharge normal and physiologic                          Uterus: normal size, contour, position, consistency,                                      mobility, and non-tender    Adnexa: normal bimanual exam    Impression. --Pelvic Pain. DUB    Plan: --Pelvic US. CBC. Will schedule for an EUA and D&C if menorrhagia continues. Advise to use analgesics prn. RTC--prn.     Syeda Stone MD  May 25, 2019

## 2019-05-23 NOTE — PROGRESS NOTES
Aman Williamson presents today for   Chief Complaint   Patient presents with    Pelvic Pain     severe for 6 months during cycle time and during ovulation has Essure in place for 9 years    Menstrual Problem     painful cycles       Is someone accompanying this pt? No  Patient does have any concerns at this time. Patient is on  birth control at this time . Depression screening     Is the patient using any DME equipment during OV? No    Depression Screening:  3 most recent PHQ Screens 5/21/2019   Little interest or pleasure in doing things Not at all   Feeling down, depressed, irritable, or hopeless Not at all   Total Score PHQ 2 0       Learning Assessment:  Learning Assessment 6/22/2017   PRIMARY LEARNER Patient   PRIMARY LANGUAGE ENGLISH   LEARNER PREFERENCE PRIMARY DEMONSTRATION   ANSWERED BY patient   RELATIONSHIP SELF       Abuse Screening:  Abuse Screening Questionnaire 5/23/2019   Do you ever feel afraid of your partner? (No Data)       Fall Risk  No flowsheet data found. This Visit Test  Results for orders placed or performed during the hospital encounter of 01/24/18   CHLAMYDIA/NEISSERIA/TRICHOMONAS AMP   Result Value Ref Range    Chlamydia amplification NEGATIVE  NEG      N. gonorrhoeae amplification NEGATIVE  NEG      Trichomonas amplification NEGATIVE  NEG      Specimen Source ENDOCERVICAL         Health Maintenance reviewed and discussed and ordered per Provider. There are no preventive care reminders to display for this patient. .      Coordination of Care:  1. Have you been to the ER, urgent care clinic since your last visit? Hospitalized since your last visit? No    2. Have you seen or consulted any other health care providers outside of the [address] [place] [address] since your last visit? Include any pap smears or colon screening. No    Pt presents with c/o having painful cycles and severe pelvic pain on the right side during cycle and ovulation.  Pt reports that she has had the ESSURE in place for 9 years

## 2019-05-26 NOTE — PATIENT INSTRUCTIONS
Chronic Pelvic Pain: Care Instructions  Your Care Instructions    Pelvic pain in women is pain below the belly button. Chronic pelvic pain means you have had this pain for at least 6 months. The pain can range from a mild ache that comes and goes to a steady pain that makes it hard to sleep, work, or enjoy life. It can be hard to know what causes this pain. You may need a number of tests to find the cause. Some common causes include problems with your reproductive system and diseases of the urinary tract or bowel. Sometimes, chronic pelvic pain may be related to past or current physical or sexual abuse. But doctors can't always find the cause. This does not mean the pain is not real or that it is \"in your head. \" It is real pain, and you need to treat it. If your doctor finds the cause of the pain, you treat the cause. For example, if the cause is hormonal, you might need to take birth control pills. But if the tests don't show a cause, you can take steps to help with the pain. Follow-up care is a key part of your treatment and safety. Be sure to make and go to all appointments, and call your doctor if you are having problems. It's also a good idea to know your test results and keep a list of the medicines you take. How can you care for yourself at home? · Be safe with medicines. Read and follow all instructions on the label. ? If the doctor gave you a prescription medicine for pain, take it as prescribed. ? If you are not taking a prescription pain medicine, ask your doctor if you can take an over-the-counter medicine. · If you have back pain, lie down and elevate your legs by placing a pillow under your knees. When lying on your side, bring your knees up to your chest.  · Put a warm water bottle, a heating pad set on low, or a warm cloth on your belly. Or take a warm bath. Do not go to sleep with a heating pad on your skin. · Relax. Try meditation, yoga exercises, or breathing. · Exercise regularly. It improves blood flow and reduces pain. · Keep a diary. Track your symptoms, menstrual cycle, sexual activity, and physical activity. Also track stressful events or illnesses. This information can help your doctor find the cause or treat it. When should you call for help? Watch closely for changes in your health, and be sure to contact your doctor if:    · Your pain gets worse.     · You do not get better as expected. Where can you learn more? Go to http://mariann-tessa.info/. Enter J955 in the search box to learn more about \"Chronic Pelvic Pain: Care Instructions. \"  Current as of: May 14, 2018  Content Version: 11.9  © 7986-9738 [employer]. Care instructions adapted under license by [employer] (which disclaims liability or warranty for this information). If you have questions about a medical condition or this instruction, always ask your healthcare professional. [place] [address] any warranty or liability for your use of this information. Abnormal Uterine Bleeding in Teens: Care Instructions  Your Care Instructions    Abnormal uterine bleeding (AUB) is irregular bleeding from the uterus that is longer or heavier than usual or does not occur at your regular time. Sometimes it's because of changes in hormone levels or growths in the uterus such as fibroids or polyps. Sometimes a cause cannot be found. You may have heavy bleeding when you are not expecting your period. Your doctor may suggest a pregnancy test, if you think you are pregnant. Follow-up care is a key part of your treatment and safety. Be sure to make and go to all appointments, and call your doctor if you are having problems. It's also a good idea to know your test results and keep a list of the medicines you take. How can you care for yourself at home? · Be safe with medicines. Read and follow all instructions on the label.   ? If the doctor gave you a prescription medicine for pain, take it as prescribed. ? If you are not taking a prescription pain medicine, ask your doctor if you can take an over-the-counter medicine. · You may be low in iron because of blood loss. Eat a balanced diet that is high in iron and vitamin C. Foods with a lot of iron include red meat, shellfish, and eggs. They also include beans and leafy green vegetables. Talk to your doctor about taking iron pills or a multivitamin. When should you call for help? Call 911 anytime you think you may need emergency care. For example, call if:    · You passed out (lost consciousness).    Call your doctor now or seek immediate medical care if:    · You have new or worse belly or pelvic pain.     · You are dizzy or lightheaded, or you feel like you may faint.     · You have severe vaginal bleeding.    Watch closely for changes in your health, and be sure to contact your doctor if:    · You think you may be pregnant.     · Your bleeding gets worse.     · You do not get better as expected. Where can you learn more? Go to http://mariann-tessa.info/. Enter Jossy Reyes in the search box to learn more about \"Abnormal Uterine Bleeding in Teens: Care Instructions. \"  Current as of: May 14, 2018  Content Version: 11.9  © 7349-9555 [employer]. Care instructions adapted under license by [employer] (which disclaims liability or warranty for this information). If you have questions about a medical condition or this instruction, always ask your healthcare professional. [place] [address] any warranty or liability for your use of this information. Abnormal Uterine Bleeding: Care Instructions  Your Care Instructions    Abnormal uterine bleeding (AUB) is irregular bleeding from the uterus that is longer or heavier than usual or does not occur at your regular time. Sometimes it is caused by changes in hormone levels.  It can also be caused by growths in the uterus, such as fibroids or polyps. Sometimes a cause cannot be found. You may have heavy bleeding when you are not expecting your period. Your doctor may suggest a pregnancy test, if you think you are pregnant. Follow-up care is a key part of your treatment and safety. Be sure to make and go to all appointments, and call your doctor if you are having problems. It's also a good idea to know your test results and keep a list of the medicines you take. How can you care for yourself at home? · Be safe with medicines. Take pain medicines exactly as directed. ? If the doctor gave you a prescription medicine for pain, take it as prescribed. ? If you are not taking a prescription pain medicine, ask your doctor if you can take an over-the-counter medicine. · You may be low in iron because of blood loss. Eat a balanced diet that is high in iron and vitamin C. Foods rich in iron include red meat, shellfish, eggs, beans, and leafy green vegetables. Talk to your doctor about whether you need to take iron pills or a multivitamin. When should you call for help? Call 911 anytime you think you may need emergency care. For example, call if:    · You passed out (lost consciousness).    Call your doctor now or seek immediate medical care if:    · You have new or worse belly or pelvic pain.     · You have severe vaginal bleeding.     · You feel dizzy or lightheaded, or you feel like you may faint.    Watch closely for changes in your health, and be sure to contact your doctor if:    · You think you may be pregnant.     · Your bleeding gets worse.     · You do not get better as expected. Where can you learn more? Go to http://mariann-tessa.info/. Enter K739 in the search box to learn more about \"Abnormal Uterine Bleeding: Care Instructions. \"  Current as of: May 14, 2018  Content Version: 11.9  © 3387-8446 [employer].  Care instructions adapted under license by [employer] (which disclaims liability or warranty for this information). If you have questions about a medical condition or this instruction, always ask your healthcare professional. [address] any warranty or liability for your use of this information. Pelvic Pain: Care Instructions  Your Care Instructions    Pelvic pain, or pain in the lower belly, can have many causes. Often pelvic pain is not serious and gets better in a few days. If your pain continues or gets worse, you may need tests and treatment. Tell your doctor about any new symptoms. These may be signs of a serious problem. Follow-up care is a key part of your treatment and safety. Be sure to make and go to all appointments, and call your doctor if you are having problems. It's also a good idea to know your test results and keep a list of the medicines you take. How can you care for yourself at home? · Rest until you feel better. Lie down, and raise your legs by placing a pillow under your knees. · Drink plenty of fluids. You may find that small, frequent sips are easier on your stomach than if you drink a lot at once. Avoid drinks with carbonation or caffeine, such as soda pop, tea, or coffee. · Try eating several small meals instead of 2 or 3 large ones. Eat mild foods, such as rice, dry toast or crackers, bananas, and applesauce. Avoid fatty and spicy foods, other fruits, and alcohol until 48 hours after your symptoms have gone away. · Take an over-the-counter pain medicine, such as acetaminophen (Tylenol), ibuprofen (Advil, Motrin), or naproxen (Aleve). Read and follow all instructions on the label. · Do not take two or more pain medicines at the same time unless the doctor told you to. Many pain medicines have acetaminophen, which is Tylenol. Too much acetaminophen (Tylenol) can be harmful. · You can put a heating pad, a warm cloth, or moist heat on your belly to relieve pain. When should you call for help?   Call your doctor now or seek immediate medical care if:    · You have a new or higher fever.     · You have unusual vaginal bleeding.     · You have new or worse belly or pelvic pain.     · You have vaginal discharge that has increased in amount or smells bad.    Watch closely for changes in your health, and be sure to contact your doctor if:    · You do not get better as expected. Where can you learn more? Go to http://mariann-tessa.info/. Enter [id number] in the search box to learn more about \"Pelvic Pain: Care Instructions. \"  Current as of: May 14, 2018  Content Version: 11.9  © 2153-3123 [employer]. Care instructions adapted under license by [employer] (which disclaims liability or warranty for this information). If you have questions about a medical condition or this instruction, always ask your healthcare professional. [address] any warranty or liability for your use of this information.

## 2019-06-03 ENCOUNTER — TELEPHONE (OUTPATIENT)
Dept: ONCOLOGY | Age: 49
End: 2019-06-03

## 2019-06-03 NOTE — TELEPHONE ENCOUNTER
Left voicemail for patient to call to schedule new patient appointment with Dr. Oscar Ott per referral from Dr. Helena Lux.

## 2019-06-12 ENCOUNTER — DOCUMENTATION ONLY (OUTPATIENT)
Dept: ORTHOPEDIC SURGERY | Age: 49
End: 2019-06-12

## 2019-07-03 ENCOUNTER — HOSPITAL ENCOUNTER (OUTPATIENT)
Dept: ULTRASOUND IMAGING | Age: 49
Discharge: HOME | End: 2019-07-03
Attending: OBSTETRICS & GYNECOLOGY
Payer: MEDICAID

## 2019-07-03 DIAGNOSIS — N93.8 DUB (DYSFUNCTIONAL UTERINE BLEEDING): ICD-10-CM

## 2019-07-03 DIAGNOSIS — R10.2 PELVIC PAIN IN FEMALE: ICD-10-CM

## 2019-07-03 PROCEDURE — 93975 VASCULAR STUDY: CPT

## 2019-07-08 ENCOUNTER — DOCUMENTATION ONLY (OUTPATIENT)
Dept: ORTHOPEDIC SURGERY | Age: 49
End: 2019-07-08

## 2019-07-08 NOTE — PROGRESS NOTES
Records Request filled out by patient in [facility] office faxed to [facility] 7-8-19.

## 2019-07-23 ENCOUNTER — TELEPHONE (OUTPATIENT)
Dept: ONCOLOGY | Age: 49
End: 2019-07-23

## 2019-08-14 ENCOUNTER — OFFICE VISIT (OUTPATIENT)
Dept: ONCOLOGY | Age: 49
End: 2019-08-14

## 2019-08-14 VITALS
DIASTOLIC BLOOD PRESSURE: 84 MMHG | OXYGEN SATURATION: 96 % | BODY MASS INDEX: 39.71 KG/M2 | HEART RATE: 96 BPM | RESPIRATION RATE: 16 BRPM | SYSTOLIC BLOOD PRESSURE: 122 MMHG | WEIGHT: 232.6 LBS | HEIGHT: 64 IN | TEMPERATURE: 97.1 F

## 2019-08-14 DIAGNOSIS — R10.2 PELVIC PAIN IN FEMALE: Primary | ICD-10-CM

## 2019-08-14 NOTE — LETTER
8/14/19 Patient: Keshia Paige YOB: 1970 Date of Visit: 8/14/2019 MD Diamond Yang [address] [place] [id number] VIA Facsimile: [phone] Dear Alix Barrow MD, Thank you for referring Ms. Nadiya Giraldo to [facility] for evaluation. My notes for this consultation are attached. If you have questions, please do not hesitate to call me. I look forward to following your patient along with you. Sincerely, Carlota Freedman MD

## 2019-08-14 NOTE — PROGRESS NOTES
[address] [facility]  [address] [place] [address], [address], [address] [facility]  [address] [facility], [address] [place] [address]  [place] [phone]  Robert Juan DO      Patient ID:  Name:  Army Jimenez  MRN:  [id number]  :  1970/48 y.o. Date:  2019      HISTORY OF PRESENT ILLNESS:  Army Jimenez is a 57 y.o.   premenopausal female referred by Dr. Mariah Baeza for pelvic adhesions. She was initioally managed by Dr. Sandra Montes for menorrgahia and pelvic pain x 6 month 2019. She was then referred to our practice for evaluation 2019 but was lost to follow up. She underwent TVUS 2019 with normal findings. She was then seenby  Dr. Hai Loaiza on 2019 with continued complaints of  pelvic pain     Pt reports  findings of adhesions to her bowel and uterus during a caesarian section in . She is s/p  myomectomy x3 with Dr. Sara Harman. Her history is also positive for Essure insertion, 2010 by Dr. Hai Loaiza. Here today for further evaluation    Pt states her pelvic pain has progressive gotten worse to where it was initially just with her periods but now is more constant. Labs:  2018 PAP:    NEGATIVE FOR INTRAEPITHELIAL LESION OR MALIGNANCY       Imaging  2019 PELVIC US  The uterus is normal in size and measures 7.8 x 4.2 x 5.1 cm. The endometrial  thickness is 5 mm which is normal in a premenopausal patient. There appears to  be a small punctate calcification in the anterior body of the uterus.       There is no evidence of fluid in the cul-de-sac.     The right ovary measures 2.8 x 2.9 x 2.1 cm. The left ovary is not identified.     Apparently the patient has a history of placement Essure device, I am not  convinced that this is visualized.     IMPRESSION:   Normal appearance of the uterus and right ovary. Nonvisualization of  the left ovary.      ROS:   As above      Patient Active Problem List    Diagnosis Date Noted    Obesity, morbid ([facility] [address] 2018    Obesity 2012    Asthma 2012    Other and unspecified hyperlipidemia 2012     Past Medical History:   Diagnosis Date    Asthma     Fibroid, uterine       Past Surgical History:   Procedure Laterality Date    HX  SECTION  2009    HX CHOLECYSTECTOMY      HX GYN  2010    essure    HX HEENT      sinus surgery    MYOMECTOMY 1-4,W/TOT 250GMS/<, [address]  , , 2007    x3    REMOVAL GALLBLADDER  1998      OB History        2    Para   1    Term   1            AB   1    Living   1       SAB        TAB   1    Ectopic        Molar        Multiple        Live Births                  Social History     Tobacco Use    Smoking status: Never Smoker    Smokeless tobacco: Never Used   Substance Use Topics    Alcohol use: No      Family History   Problem Relation Age of Onset    Cancer Father     Diabetes Father     Hypertension Father     Diabetes Maternal Grandmother     Cancer Maternal Grandfather     Diabetes Paternal Grandmother     Cancer Paternal Grandfather     No Known Problems Mother     No Known Problems Brother       Current Outpatient Medications   Medication Sig    montelukast (SINGULAIR) 10 mg tablet Take 1 Tab by mouth daily.  albuterol (PROVENTIL HFA, VENTOLIN HFA, PROAIR HFA) 90 mcg/actuation inhaler Take 2 Puffs by inhalation every six (6) hours as needed for Wheezing.  fluticasone-salmeterol (ADVAIR DISKUS) 250-50 mcg/dose diskus inhaler Take 1 Puff by inhalation every twelve (12) hours.  albuterol (PROVENTIL VENTOLIN) 2.5 mg /3 mL (0.083 %) nebulizer solution 1.5 mL by Nebulization route every four (4) hours as needed for Wheezing.  beclomethasone (BECONASE AQ) 42 mcg (0.042 %) nasal spray 1 Puff by Both Nostrils route two (2) times a day.  methylPREDNISolone (MEDROL DOSEPACK) 4 mg tablet Use as directed     No current facility-administered medications for this visit. Allergies   Allergen Reactions    Pcn [Penicillins] Hives          OBJECTIVE:    Physical Exam  VITAL SIGNS: Visit Vitals  /84 (BP 1 Location: Left arm, BP Patient Position: Sitting)   Pulse 96   Temp 97.1 °F (36.2 °C) (Oral)   Resp 16   Ht 5' 4\" (1.626 m)   Wt 105.5 kg (232 lb 9.6 oz)   LMP 07/31/2019 (Exact Date)   SpO2 96%   BMI 39.93 kg/m²      GENERAL DINA: in no apparent distress and well developed and well nourished   MUSCULOSKEL: no joint tenderness, deformity or swelling   INTEGUMENT:  warm and dry, no rashes or lesions   ABDOMEN . soft, NT, ND, No masses appreciated   EXTREMITIES: extremities normal, atraumatic, no cyanosis or edema   PELVIC: External genitalia: normal general appearance  Vaginal: normal mucosa without prolapse or lesions  Cervix: normal appearance  Adnexa: normal bimanual exam  Uterus: normal single, nontender   RECTAL: deferred   BHAVIN SURVEY: Cervical, supraclavicular, axillary and inguinal nodes normal.   NEURO: Grossly normal         IMPRESSION/PLAN:  1. pelvic pain   -reviewed her imaging   -discussed her pain could be coming from her uterus and not unreasonable to proceed with hysterectomy,bilateral salpingectomies   -discussed robotic approach but did explain given prior surgeries and reported adhesions there is possibility of requiring large incision   -Risks, benefits and alternatives of surgery discussed in detail  Risks including bleeding, infection, blood clot, injury to nearby organs including bladder, bowel, ureters and blood vessels.     -pt to decide and let us know        The total time spent was 60 minutes regarding this patients diagnosis of pelvic pain and >50% of this time was spent counseling and coordinating care    [address] [place] [address]  Gynecologic Oncology  7/40/601501:24 AM

## 2019-08-14 NOTE — PATIENT INSTRUCTIONS
Chronic Pelvic Pain: Care Instructions  Your Care Instructions    Pelvic pain in women is pain below the belly button. Chronic pelvic pain means you have had this pain for at least 6 months. The pain can range from a mild ache that comes and goes to a steady pain that makes it hard to sleep, work, or enjoy life. It can be hard to know what causes this pain. You may need a number of tests to find the cause. Some common causes include problems with your reproductive system and diseases of the urinary tract or bowel. Sometimes, chronic pelvic pain may be related to past or current physical or sexual abuse. But doctors can't always find the cause. This does not mean the pain is not real or that it is \"in your head. \" It is real pain, and you need to treat it. If your doctor finds the cause of the pain, you treat the cause. For example, if the cause is hormonal, you might need to take birth control pills. But if the tests don't show a cause, you can take steps to help with the pain. Follow-up care is a key part of your treatment and safety. Be sure to make and go to all appointments, and call your doctor if you are having problems. It's also a good idea to know your test results and keep a list of the medicines you take. How can you care for yourself at home? · Be safe with medicines. Read and follow all instructions on the label. ? If the doctor gave you a prescription medicine for pain, take it as prescribed. ? If you are not taking a prescription pain medicine, ask your doctor if you can take an over-the-counter medicine. · If you have back pain, lie down and elevate your legs by placing a pillow under your knees. When lying on your side, bring your knees up to your chest.  · Put a warm water bottle, a heating pad set on low, or a warm cloth on your belly. Or take a warm bath. Do not go to sleep with a heating pad on your skin. · Relax. Try meditation, yoga exercises, or breathing. · Exercise regularly. It improves blood flow and reduces pain. · Keep a diary. Track your symptoms, menstrual cycle, sexual activity, and physical activity. Also track stressful events or illnesses. This information can help your doctor find the cause or treat it. When should you call for help? Watch closely for changes in your health, and be sure to contact your doctor if:    · Your pain gets worse.     · You do not get better as expected. Where can you learn more? Go to http://mariann-tessa.info/. Enter Y820 in the search box to learn more about \"Chronic Pelvic Pain: Care Instructions. \"  Current as of: February 19, 2019  Content Version: 12.1  © 5824-8466 Healthwise, [employer]. Care instructions adapted under license by [employer] (which disclaims liability or warranty for this information). If you have questions about a medical condition or this instruction, always ask your healthcare professional. [address] any warranty or liability for your use of this information.

## 2019-08-14 NOTE — PROGRESS NOTES
Harjinder Leblanc, a 57 y.o. female,  is here for   Chief Complaint   Patient presents with    New Patient     referred by Dr. Vahid Grijalva for adhesions       Visit Vitals  /84 (BP 1 Location: Left arm, BP Patient Position: Sitting)   Pulse 96   Temp 97.1 °F (36.2 °C) (Oral)   Resp 16   Ht 5' 4\" (1.626 m)   Wt 105.5 kg (232 lb 9.6 oz)   SpO2 96%   BMI 39.93 kg/m²       Patient reports intermittent pelvic pain. Denies any unusual vaginal bleeding, discharge, irritation, or odor. Denies experiencing any constipation or diarrhea. No burning, discomfort, or irritation with urination.

## 2019-09-11 ENCOUNTER — DOCUMENTATION ONLY (OUTPATIENT)
Dept: ORTHOPEDIC SURGERY | Facility: CLINIC | Age: 49
End: 2019-09-11

## 2019-09-11 DIAGNOSIS — M79.641 RIGHT HAND PAIN: Primary | ICD-10-CM

## 2019-09-11 NOTE — PROGRESS NOTES
Patient complains of right hand pain, same pain as in her left hand which she was previously seen by dr Azra Jordan for, Dr. Rand Doherty recommended brace for right hand, order in and patient aware, she may  from [facility], she will f/u with Dr. Rand Doherty tomorrow.

## 2019-09-12 ENCOUNTER — OFFICE VISIT (OUTPATIENT)
Dept: ORTHOPEDIC SURGERY | Age: 49
End: 2019-09-12

## 2019-09-12 VITALS
HEART RATE: 75 BPM | OXYGEN SATURATION: 99 % | DIASTOLIC BLOOD PRESSURE: 80 MMHG | SYSTOLIC BLOOD PRESSURE: 126 MMHG | BODY MASS INDEX: 38.07 KG/M2 | WEIGHT: 223 LBS | TEMPERATURE: 98 F | RESPIRATION RATE: 13 BRPM | HEIGHT: 64 IN

## 2019-09-12 DIAGNOSIS — M25.531 RIGHT WRIST PAIN: ICD-10-CM

## 2019-09-12 DIAGNOSIS — M19.031: Primary | ICD-10-CM

## 2019-09-12 NOTE — PROGRESS NOTES
Marilu Blackmon is a 54 y.o. female right handed . Worker's Compensation and legal considerations: not known. Vitals:    19 1047   BP: 126/80   Pulse: 75   Resp: 13   Temp: 98 °F (36.7 °C)   TempSrc: Oral   SpO2: 99%   Weight: 223 lb (101.2 kg)   Height: 5' 4\" (1.626 m)   PainSc:   6   PainLoc: Wrist           Chief Complaint   Patient presents with    Wrist Pain     RIGHT WRIST PAIN       HPI:  Pt comes in today for follow-up. Previously she was seen for a tendinitis of her left radial sided extensors to the digits. In a TKO, brace this has much improved. She reports pain     Initial HPI: Patient comes in today with complaints of left hand pain. She reports this is been going on for couple weeks. She localizes the pain to the dorsal first webspace as well as the dorsum of the second and third metacarpals. She denies any specific injuries. She is unable to determine if it is worse in the morning or afternoon. Date of onset: 2019 approximately    Injury: No    Prior Treatment:  No    Numbness/ Tingling: No    ROS: Review of Systems - General ROS: negative  Respiratory ROS: no cough, shortness of breath, or wheezing  Cardiovascular ROS: no chest pain or dyspnea on exertion  Musculoskeletal ROS: positive for - pain in hand - left  Neurological ROS: negative  Dermatological ROS: negative    Past Medical History:   Diagnosis Date    Asthma     Fibroid, uterine        Past Surgical History:   Procedure Laterality Date    HX  SECTION      HX CHOLECYSTECTOMY      HX GYN  2010    essure    HX HEENT      sinus surgery    MYOMECTOMY 1-4,W/TOT 250GMS/<,ABD APPRCH  , , 2007    x3    REMOVAL GALLBLADDER  1998       Current Outpatient Medications   Medication Sig Dispense Refill    montelukast (SINGULAIR) 10 mg tablet Take 1 Tab by mouth daily.  30 Tab 2    albuterol (PROVENTIL HFA, VENTOLIN HFA, PROAIR HFA) 90 mcg/actuation inhaler Take 2 Puffs by inhalation every six (6) hours as needed for Wheezing. 3 Inhaler 3    fluticasone-salmeterol (ADVAIR DISKUS) 250-50 mcg/dose diskus inhaler Take 1 Puff by inhalation every twelve (12) hours. 3 Inhaler 3    beclomethasone (BECONASE AQ) 42 mcg (0.042 %) nasal spray 1 Puff by Both Nostrils route two (2) times a day. 30 g 3    albuterol (PROVENTIL VENTOLIN) 2.5 mg /3 mL (0.083 %) nebulizer solution 1.5 mL by Nebulization route every four (4) hours as needed for Wheezing. 24 Each 5    methylPREDNISolone (MEDROL DOSEPACK) 4 mg tablet Use as directed 1 Dose Pack 0       Allergies   Allergen Reactions    Pcn [Penicillins] Hives         PE:     Hand:    Examination L Digit(s) R Digit(s)   1st CMC Tenderness -  +    1st CMC Grind -  +    Glen Nodes -  -    Heberden Nodes -  -    A1 Pulley Tenderness -  -    Triggering -  -    UCL Instability -  -    RCL Instability -  -    Lateral Stress Pain -  -    Palmar Cords -  -    Tabletop test -  -    Garrod's Pads -  -     Strength       Pinch Strength         ROM: Full      Left Hand: Tenderness has significantly improved    Imaging:     Plain films of the right wrist do not shoe significant degenerative changes, but may be eaton stage 1 at the cmc joint        ICD-10-CM ICD-9-CM    1. Localized primary carpometacarpal osteoarthrosis, right M19.031 715.14    2.  Right wrist pain M25.531 719.43 AMB POC XRAY, WRIST; COMPLETE, 3+ VIE       Plan:     Right Cool Comfort Brace    F/U PRN    Plan was reviewed with patient, who verbalized agreement and understanding of the plan

## 2019-09-12 NOTE — PROGRESS NOTES
1. Have you been to the ER, urgent care clinic since your last visit? Hospitalized since your last visit? No    2. Have you seen or consulted any other health care providers outside of the [address] [place] [address] since your last visit? Include any pap smears or colon screening.  No

## 2020-06-10 ENCOUNTER — HOSPITAL ENCOUNTER (OUTPATIENT)
Dept: MAMMOGRAPHY | Age: 50
Discharge: HOME | End: 2020-06-10
Attending: FAMILY MEDICINE
Payer: MEDICAID

## 2020-06-10 DIAGNOSIS — Z12.31 VISIT FOR SCREENING MAMMOGRAM: ICD-10-CM

## 2020-06-10 PROCEDURE — 77063 BREAST TOMOSYNTHESIS BI: CPT

## 2020-06-26 ENCOUNTER — HOSPITAL ENCOUNTER (OUTPATIENT)
Dept: MAMMOGRAPHY | Age: 50
Discharge: HOME | End: 2020-06-26
Attending: FAMILY MEDICINE
Payer: MEDICAID

## 2020-06-26 ENCOUNTER — HOSPITAL ENCOUNTER (OUTPATIENT)
Dept: ULTRASOUND IMAGING | Age: 50
Discharge: HOME | End: 2020-06-26
Attending: FAMILY MEDICINE
Payer: MEDICAID

## 2020-06-26 DIAGNOSIS — R92.8 ABNORMAL MAMMOGRAM: ICD-10-CM

## 2020-06-26 PROCEDURE — 77061 BREAST TOMOSYNTHESIS UNI: CPT

## 2021-05-18 ENCOUNTER — TRANSCRIBE ORDER (OUTPATIENT)
Dept: SCHEDULING | Age: 51
End: 2021-05-18

## 2021-05-18 DIAGNOSIS — Z12.31 SCREENING MAMMOGRAM FOR HIGH-RISK PATIENT: Primary | ICD-10-CM

## 2021-06-11 ENCOUNTER — HOSPITAL ENCOUNTER (OUTPATIENT)
Dept: MAMMOGRAPHY | Age: 51
Discharge: HOME | End: 2021-06-11
Attending: FAMILY MEDICINE
Payer: MEDICAID

## 2021-06-11 DIAGNOSIS — Z12.31 SCREENING MAMMOGRAM FOR HIGH-RISK PATIENT: ICD-10-CM

## 2021-06-11 PROCEDURE — 77063 BREAST TOMOSYNTHESIS BI: CPT

## 2022-03-19 PROBLEM — E66.01 OBESITY, MORBID (HCC): Status: ACTIVE | Noted: 2018-01-24

## 2022-05-12 ENCOUNTER — TRANSCRIBE ORDER (OUTPATIENT)
Dept: SCHEDULING | Age: 52
End: 2022-05-12

## 2022-05-12 DIAGNOSIS — Z12.31 VISIT FOR SCREENING MAMMOGRAM: Primary | ICD-10-CM

## 2022-05-16 ENCOUNTER — HOSPITAL ENCOUNTER (OUTPATIENT)
Dept: PHYSICAL THERAPY | Age: 52
Discharge: HOME | End: 2022-05-16
Payer: COMMERCIAL

## 2022-05-16 PROCEDURE — 97161 PT EVAL LOW COMPLEX 20 MIN: CPT

## 2022-05-16 NOTE — PROGRESS NOTES
[facility]  [address] [facility], [address] [place] [address], [facility] [address]  [phone] fax      Plan of Care/ Statement of Necessity for Physical Therapy Services    Patient name: Kia To Start of Care: 2022   Referral source: Chika Hale DO : 1970    Medical Diagnosis: Pain in left shoulder [M25.512]  Payor: [employer] MEDICAID / Plan: [address] [place] [address] / Product Type: Managed Care Medicaid /  Onset Date:exacerbation approximately 10/1/21    Treatment Diagnosis: left shoulder pain   Prior Hospitalization: see medical history Provider#: [id number]   Medications: Verified on Patient summary List    Comorbidities: asthma, DM   Prior Level of Function:  I activities and ADLS with recurrent left shoulder pain, able  work, drive, do household and community activities again all with recurrent left shoulder pain     The Plan of Care and following information is based on the information from the initial evaluation. Assessment/ key information: 53 YO female diagnosed as above and with S/S consistent with above diagnosis presents to skilled outpatient PT [facility] left shoulder pain. Initially began as left bicep pain that over time is now also radiating up into the shoulder. She is right hand dominant. She reports painful episodes over the years most recently 2021. She presents with S/S of impingement and biceps TTP left shoulder, TTP at subacromial bursa, UT ST. Also she has LOM, decrease tolerance to ADLS and activities, and MMT NA due to pain and LOM. Patient demonstrates the potential to make gains with improved ROM, strength, endurance/activity tolerance, functional FOTO survey score   and all within a reasonable time frame so as to increase their functional independence with ADLs and activities for carryover to  Improved quality of life, tolerance to household and community activities, work demands.  Patient requires skilled Physical Therapy so as to monitor their response to and modify their treatment plan accordingly. Patient appears to be an appropriate candidate for skilled outpatient Physical Therapy. Evaluation Complexity History MEDIUM  Complexity : 1-2 comorbidities / personal factors will impact the outcome/ POC ; Examination MEDIUM Complexity : 3 Standardized tests and measures addressing body structure, function, activity limitation and / or participation in recreation  ;Presentation LOW Complexity : Stable, uncomplicated  ;Clinical Decision Making MEDIUM Complexity : FOTO score of 26-74  Overall Complexity Rating: LOW   Problem List: pain affecting function, decrease ROM, decrease strength, decrease ADL/ functional abilitiies, decrease activity tolerance, decrease flexibility/ joint mobility and other FOTO   Treatment Plan may include any combination of the following: Therapeutic exercise, Therapeutic activities, Neuromuscular re-education, Physical agent/modality, Manual therapy, Patient education, Self Care training and Home safety training  Patient / Family readiness to learn indicated by: asking questions, trying to perform skills and interest  Persons(s) to be included in education: patient (P)  Barriers to Learning/Limitations: None  Patient Goal (s): removal of pain  Patient Self Reported Health Status: good  Rehabilitation Potential: good    Short Term Goals: To be accomplished in 4 treatments:   1 patient will have established and be I with HEP to aid with I and self management at discharge   EVAL issued HEP   2 patient will have pain 4/10 to aid with increase tolerance to ADLS and activities at home   EVAL 6    Long Term Goals:  To be accomplished in 8 treatments:   1 patient will have pain 2/10 to aid with increase tolerance to ADLS and activities at home   EVAL 6   2 patient will have FOTO improved to projected gains of 66 to show increase tolerance to ADLS and activities at home and in the community   EVAL 46   3 patient will have MMT 4+ MMG left shoulder to aid with increase tolerance to activities at home like lifting groceries   EVAL NA left UE shoulder due to pain   4 patient will have Left AROM F 125 for overhead reaching at home   EVAL left shoulder F 80      Frequency / Duration: Patient to be seen 2 times per week for 4 weeks. Patient/ Caregiver education and instruction: Diagnosis, prognosis, self care, activity modification and exercises   [x]  Plan of care has been reviewed with NA Richard, PT 5/16/2022 5:44 PM  ________________________________________________________________________    I certify that the above Therapy Services are being furnished while the patient is under my care. I agree with the treatment plan and certify that this therapy is necessary.     [de-identified] Signature:____________Date:_________TIME:________     Dalia Arredondo Son, DO  ** Signature, Date and Time must be completed for valid certification **      Please sign and return to In [address] [place] [address], [address] [place] [address], [address] [place] [address]  [phone] fax

## 2022-05-16 NOTE — PROGRESS NOTES
PT DAILY TREATMENT NOTE/SHOULDER EVAL     Patient Name: Lula Gongora  Date:2022  : 1970  [x]  Patient  Verified  Payor: [employer]ICAID / Plan: [facility] [employer]ERS PLUS / Product Type: Managed Care Medicaid /    In time:518  Out time:558  Total Treatment Time (min): 40  Visit #: 1 of 8         Treatment Area: Pain in left shoulder [M25.512]    SUBJECTIVE  Pain Level (0-10 scale): 6  [x]constant []intermittent []improving [x]worsening []no change since onset  Worse sleeping on right side and trying to get comfortable, driving,  trying to lift things,  things ie purse, reaching for things and with bathing Better KT, lidocaine patches   Any medication changes, allergies to medications, adverse drug reactions, diagnosis change, or new procedure performed?: [x] No    [] Yes (see summary sheet for update)  Subjective functional status/changes:     PLOF: I activities and ADLS with recurrent left shoulder pain, able  work, drive, do household and community activities again all with recurrent left shoulder pain  Limitations to PLOF: pain  Mechanism of Injury:original injury at least 15 years ago with recurrent episodic pain since that time - Most recently approx. 2021  Current symptoms/Complaints: 53 YO female diagnosed as above and with S/S consistent with above diagnosis presents to skilled outpatient PT [facility] left shoulder pain. Initially began as left bicep pain that over time is now also radiating up into the shoulder. She is right hand dominant.  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[employer], remote desk work  Living Situation: lives in a 1 story house, not alone  Pt Goals: removal of pain  Barriers: []pain []financial []time []transportation []other  Motivation: good  Substance use: []Alcohol []Tobacco []other:   FABQ Score: []low []elevate  Cognition: A & O x 3    Other:    OBJECTIVE/EXAMINATION  Domestic Life: work, household , caring for son, community activities  Activity/Recreational Limitations: pain  Mobility: I  Self Care: I, with pain ie dressing         40 min []Eval                  []Re-Eval        Rationale: With   [] TE   [] TA   [] neuro   [] other: Patient Education: [x] Review HEP    [] Progressed/Changed HEP based on:   [] positioning   [] body mechanics   [] transfers   [] heat/ice application    [x] other: HEP issued as part of eval  POC, FOTO, GOALS, anatomy     Other Objective/Functional Measures:      Physical Therapy Evaluation - Shoulder    Posture: [] Poor    [x] Fair    [] Good    Describe: mild     ROM:  [] Unable to assess at this time                                           AROM                                                              PROM   Left Right  Left Right   Flexion 80 Pain 152 Flexion     Extension   Extension     Scaption/ABD 75 Pain 160 Scaptin/ABD     ER @ 0 Degrees   ER @ 0 Degrees     ER @ 90 Degrees   ER @ 90 Degrees     IR @ 90 Degrees   IR @ 90 Degrees     Right UE HBH able, HBB to lower Tsp  Left UE HBH able with pain, HBB to SIJ with pain    End Feel / Painful Arc: no for left UE    Strength:   [x] Unable to assess at this time due to pain and LOM                                                                          L (1-5) R (1-5) Pain   Flexors NA 5 [] Yes   [] No   Abductors NA 5 [] Yes   [] No   External Rotators NA 5 [] Yes   [] No   Internal Rotators NA 5 [] Yes   [] No   Supraspinatus   [] Yes   [] No   Serratus Anterior   [] Yes   [] No   Lower Trapezius   [] Yes   [] No   Elbow Flexion   [] Yes   [] No   Elbow Extension   [] Yes   [] No     Scapulohumoral Control / Rhythm:  Able to eccentrically lower with good control?  Left: [x] Yes   [] No     Right: [x] Yes   [] No    Accessory Motions:    Palpation  [] Min  [x] Mod  [] Severe    Location: PTTP anterior left shoulder, bicep tendon, sub acromial bursa    [] Min  [x] Mod  [] Severe    Location:TTP left bicep muscle belly  [] Min  [] Mod  [] Severe    Location:         Adson's Test  [] Pos   [] Neg Yergason's Test [] Pos   [] Neg  Yolette's Test  [] Pos   [] Neg Annabelle's Sign [] Pos   [] Neg  Neer's Test  [x] Pos   [] Neg Clunk Test  [] Pos   [] Neg  Hawkin's Test  [x] Pos   [] Neg AC Joint  [] Pos   [] Neg  Speed's Test  [x] Pos   [] Neg SC Joint  [] Pos   [] Neg  Empty Can  [x] Pos   [] Neg Pectoral Tightness [] Pos   [] Neg  Anterior Apprehension [] Pos   [] Neg   Posterior Apprehension [] Pos   [] Neg      Other Tests / Comments:        Pain Level (0-10 scale) post treatment: 6    ASSESSMENT/Changes in Function: Patient demonstrates the potential to make gains with improved ROM, strength, endurance/activity tolerance, functional FOTO survey score   and all within a reasonable time frame so as to increase their functional independence with ADLs and activities for carryover to  Improved quality of life, tolerance to household and community activities, work demands. Patient requires skilled Physical Therapy so as to monitor their response to and modify their treatment plan accordingly. Patient appears to be an appropriate candidate for skilled outpatient Physical Therapy. Patient will continue to benefit from skilled PT services to modify and progress therapeutic interventions, address ROM deficits, address strength deficits, analyze and address soft tissue restrictions, analyze and cue movement patterns, analyze and modify body mechanics/ergonomics, assess and modify postural abnormalities and instruct in home and community integration to attain remaining goals.      [x]  See Plan of Care  []  See progress note/recertification  []  See Discharge Summary         Progress towards goals / Updated goals:       PLAN  [x]  Upgrade activities as tolerated     []  Continue plan of care  []  Update interventions per flow sheet []  Discharge due to:_  []  Other:_      Jaclyn Richard, PT 5/16/2022  5:20 PM

## 2022-06-08 ENCOUNTER — HOSPITAL ENCOUNTER (OUTPATIENT)
Dept: PHYSICAL THERAPY | Age: 52
Discharge: HOME | End: 2022-06-08
Payer: COMMERCIAL

## 2022-06-08 PROCEDURE — 97110 THERAPEUTIC EXERCISES: CPT

## 2022-06-08 PROCEDURE — 97140 MANUAL THERAPY 1/> REGIONS: CPT

## 2022-06-08 NOTE — PROGRESS NOTES
PT DAILY TREATMENT NOTE     Patient Name: Antolin Hackett  Date:2022  : 1970  [x]  Patient  Verified  Payor: Ronda Au / [place]: [place] [employer]  [facility] PT / Product Type: Commerical /    In time:501 pm   Out time: 555 pm   Total Treatment Time (min): 47  Visit #: 2 of 8    Treatment Area: Pain in left shoulder [M25.512]    SUBJECTIVE  Pain Level (0-10 scale): 6/10   Any medication changes, allergies to medications, adverse drug reactions, diagnosis change, or new procedure performed?: [x] No    [] Yes (see summary sheet for update)  Subjective functional status/changes:   [] No changes reported  Patient reports that her shoulder still bothers her. Patient explains that most of her pain is in her bicep. OBJECTIVE    Modality rationale: decrease inflammation, decrease pain and increase tissue extensibility to improve the patients ability to assist with performing ADL's and functional tasks without limitations.    Min Type Additional Details    [] Estim:  []Unatt       []IFC  []Premod                        []Other:  []w/ice   []w/heat  Position:  Location:    [] Estim: []Att    []TENS instruct  []NMES                    []Other:  []w/US   []w/ice   []w/heat  Position:  Location:    []  Traction: [] Cervical       []Lumbar                       [] Prone          []Supine                       []Intermittent   []Continuous Lbs:  [] before manual  [] after manual    []  Ultrasound: []Continuous   [] Pulsed                           []1MHz   []3MHz W/cm2:  Location:    []  Iontophoresis with dexamethasone         Location: [] Take home patch   [] In clinic   10 []  Ice     [x]  heat  []  Ice massage  []  Laser   []  Anodyne Position:supine  Location:left shoulder     []  Laser with stim  []  Other:  Position:  Location:    []  Vasopneumatic Device    []  Right     []  Left  Pre-treatment girth:  Post-treatment girth:  Measured at (location):  Pressure:       [] lo [] med [] hi   Temperature: [] lo [] med [] hi   [] Skin assessment post-treatment:  []intact []redness- no adverse reaction  []redness - adverse reaction:     34 min Therapeutic Exercise:  [] See flow sheet :   Rationale: increase ROM and increase strength to improve the patients overall muscle endurance and activity tolerance for ADL's and functional tasks. min Therapeutic Activity:  []  See flow sheet :   Rationale: increase strength and improve coordination  to improve the patients ability to safely perform dynamic activities and to improve functional performance of ADL's.      min Neuromuscular Re-education:  []  See flow sheet :   Rationale: increase strength, improve coordination and improve balance  to improve the patients ability to perform activities with good form, stability and proprioception. 10 min Manual Therapy: STM/TPR to left shoulder musculature; PROM to right shoulder    The manual therapy interventions were performed at a separate and distinct time from the therapeutic activities interventions. Rationale: decrease pain, increase ROM, increase tissue extensibility and decrease trigger points to assist with performing exercises with ease. With   [] TE   [] TA   [] neuro   [] other: Patient Education: [x] Review HEP    [] Progressed/Changed HEP based on:   [] positioning   [] body mechanics   [] transfers   [] heat/ice application    [] other:      Other Objective/Functional Measures:  Initiated exercises set a initial evaluation. Verbal cues required for proper form. Left shoulder PROM: Flexion- 145 degs, Abduction-145 degs, ER at 45 degrees- 50 degrees (painful), IR at 45 degrees- 50 degs (painful)     TPR present at left biceps    Pain Level (0-10 scale) post treatment: 4-5/10     ASSESSMENT/Changes in Function:   Patient is progressing as expected towards goals. Patient performed exercises, as per flow sheet, to assist with increasing left shoulder ROM.  Patient tolerated today's exercises with no increase in left shoulder pain. Muscle spasms decreased following manual therapy. Patient responded well to today's session, as evident by, a decrease in left shoulder pain. Patient will continue to benefit from skilled PT services to modify and progress therapeutic interventions, address functional mobility deficits, address ROM deficits, address strength deficits, analyze and address soft tissue restrictions, analyze and cue movement patterns, analyze and modify body mechanics/ergonomics, assess and modify postural abnormalities and instruct in home and community integration to attain remaining goals. []  See Plan of Care  []  See progress note/recertification  []  See Discharge Summary         Progress towards goals / Updated goals:  Short Term Goals: To be accomplished in 4 treatments:               1 patient will have established and be I with HEP to aid with I and self management at discharge               EVAL issued HEP               2 patient will have pain 4/10 to aid with increase tolerance to ADLS and activities at home               EVAL 6     Long Term Goals:  To be accomplished in 8 treatments:               1 patient will have pain 2/10 to aid with increase tolerance to ADLS and activities at home               EVAL 6               2 patient will have FOTO improved to projected gains of 66 to show increase tolerance to ADLS and activities at home and in the community               EVAL 46               3 patient will have MMT 4+ MMG left shoulder to aid with increase tolerance to activities at home like lifting groceries               EVAL NA left UE shoulder due to pain               4 patient will have Left AROM F 125 for overhead reaching at home               EVAL left shoulder F 80    PLAN  [x]  Upgrade activities as tolerated     [x]  Continue plan of care  []  Update interventions per flow sheet       []  Discharge due to:_  []  Other:_      Shaheed Sargent, PTA 6/8/2022  11:13 AM    Future Appointments   Date Time Provider Juliocesar Lynch   6/8/2022  5:15 PM Christopher Acuna, NA [facility]   6/13/2022  5:15 PM Natalie Mendoza, PT [facility]   6/14/2022  7:00 AM [facility] RM 4 3D [facility]   6/15/2022  5:15 PM Christopher Acuna PTA [facility]   6/20/2022  4:30 PM Christopher Acuna PTA [facility]

## 2022-06-13 ENCOUNTER — HOSPITAL ENCOUNTER (OUTPATIENT)
Dept: PHYSICAL THERAPY | Age: 52
Discharge: HOME | End: 2022-06-13
Payer: COMMERCIAL

## 2022-06-13 PROCEDURE — 97110 THERAPEUTIC EXERCISES: CPT

## 2022-06-13 PROCEDURE — 97530 THERAPEUTIC ACTIVITIES: CPT

## 2022-06-13 PROCEDURE — 97140 MANUAL THERAPY 1/> REGIONS: CPT

## 2022-06-13 NOTE — PROGRESS NOTES
PT DAILY TREATMENT NOTE     Patient Name: Renard Berrios  Date:2022  : 1970  [x]  Patient  Verified  Payor: Avi Chavarria / [place] [place] [employer]  [facility] / Product Type: Commerical /    In time:515  Out time:618  Total Treatment Time (min): 63  Visit #: 3 of 8      Treatment Area: Pain in left shoulder [M25.512]    SUBJECTIVE  Pain Level (0-10 scale): 6  Any medication changes, allergies to medications, adverse drug reactions, diagnosis change, or new procedure performed?: [x] No    [] Yes (see summary sheet for update)  Subjective functional status/changes:   [] No changes reported  \"I am still having the most pain and difficulty with bringing my arm behind my back. That pain is excurciating. Marquis Chinchilla \"    OBJECTIVE    Modality rationale: decrease inflammation, decrease pain and increase tissue extensibility to improve the patients ability to tolerate ADLs and activities   Min Type Additional Details    [] Estim:  []Unatt       []IFC  []Premod                        []Other:  []w/ice   []w/heat  Position:  Location:    [] Estim: []Att    []TENS instruct  []NMES                    []Other:  []w/US   []w/ice   []w/heat  Position:  Location:    []  Traction: [] Cervical       []Lumbar                       [] Prone          []Supine                       []Intermittent   []Continuous Lbs:  [] before manual  [] after manual    []  Ultrasound: []Continuous   [] Pulsed                           []1MHz   []3MHz W/cm2:  Location:    []  Iontophoresis with dexamethasone         Location: [] Take home patch   [] In clinic   10 []  Ice     [x]  heat  Post   []  Ice massage  []  Laser   []  Anodyne Position:reclined  Location:left shoulder    []  Laser with stim  []  Other:  Position:  Location:    []  Vasopneumatic Device    []  Right     []  Left  Pre-treatment girth:  Post-treatment girth:  Measured at (location):  Pressure:       [] lo [] med [] hi   Temperature: [] lo [] med [] hi   [] Skin assessment post-treatment:  []intact []redness- no adverse reaction    []redness - adverse reaction:          30 min Therapeutic Exercise:  [x] See flow sheet :   Rationale: increase ROM, increase strength and improve coordination to improve the patients ability to tolerate ADLS and activities    8 min Therapeutic Activity:  [x]  See flow sheet :   Rationale: increase ROM, increase strength and improve coordination  to improve the patients ability to tolerate ADLS and activities        15 min Manual Therapy:  ROM all planes left shoulder in reclined position with gentle mobs attempted for IR/ER. STM arm musculature left shoulder and upper arm. The manual therapy interventions were performed at a separate and distinct time from the therapeutic activities interventions. Rationale: decrease pain, increase ROM, increase tissue extensibility and decrease trigger points to tolerate ADLS and activities           With   [x] TE   [x] TA   [] neuro   [] other: Patient Education: [x] Review HEP    [x] Progressed/Changed HEP based on:   [] positioning   [] body mechanics   [] transfers   [] heat/ice application    [] other:      Other Objective/Functional Measures: VC exercises and technique     Pain Level (0-10 scale) post treatment: 4    ASSESSMENT/Changes in Function: tolerated well. Residual pain left shoulder and arm. Decrease tolerance to ADLS and activities with HBB movements left UE. Patient will continue to benefit from skilled PT services to modify and progress therapeutic interventions, address ROM deficits, address strength deficits, analyze and address soft tissue restrictions, analyze and cue movement patterns, analyze and modify body mechanics/ergonomics, assess and modify postural abnormalities and instruct in home and community integration to attain remaining goals.      [x]  See Plan of Care  []  See progress note/recertification  []  See Discharge Summary         Progress towards goals / Updated goals:  Short Term Goals: To be accomplished in 4 treatments:               1 patient will have established and be I with HEP to aid with I and self management at discharge               EVAL issued HEP   CURRENT reports compliance 6/13/22               2 patient will have pain 4/10 to aid with increase tolerance to ADLS and activities at home               EVAL 6   CURRENT 6 6/13/22     [facility] be accomplished in 8 treatments:               1 patient will have pain 2/10 to aid with increase tolerance to ADLS and activities at home               EVAL 6   CURRENT 6 6/13/22               2 patient will have FOTO improved to projected gains of 66 to show increase tolerance to ADLS and activities at home and in the community               EVAL 46   CURRENT ongoing NA 6/13/22               3 patient will have MMT 4+ MMG left shoulder to aid with increase tolerance to activities at home like lifting groceries               EVAL NA left UE shoulder due to pain   CURRENT ongoing 6/13/22               4 patient will have Left AROM F 125 for overhead reaching at home               EVAL left shoulder F 80   CURRENT ongoing 6/13/22    PLAN  [x]  Upgrade activities as tolerated     [x]  Continue plan of care  []  Update interventions per flow sheet       []  Discharge due to:_  []  Other:_      Kedar Mallory, PT 6/13/2022  5:15 PM    Future Appointments   Date Time Provider Juliocesar Lynch   6/14/2022  7:00 AM [facility] RM 4 3D [facility]   6/15/2022  5:15 PM Aparna Laird PTA [facility]   6/20/2022  4:30 PM Aparna Laird PTA [facility]

## 2022-06-13 NOTE — PROGRESS NOTES
[facility] [address] [facility]  [address] [facility], [address] [place] [address], [facility] [address]  [phone] fax    Physician Update  [x] Progress Note  [] Discharge Summary  Patient name: Lula Gongora Start of Care: 22   Referral source: Anayeli Bull DO : 1970   Medical/Treatment Diagnosis: Pain in left shoulder [M25.512]  Payor: Twyla Chaparro / Plan: [facility] / Product Type: Commerical /  Onset Date:exacerbation approximately 10/1/21                  Prior Hospitalization: see medical history Provider#: [id number]   Medications: Verified on Patient Summary List     Comorbidities: asthma, DM   Prior Level of Function:  I activities and ADLS with recurrent left shoulder pain, able  work, drive, do household and community activities again all with recurrent left shoulder pain  Visits from Start of Care: 3    Missed Visits: 1    Status at Evaluation/Last Progress Note: 53 YO female diagnosed as above and with S/S consistent with above diagnosis presents to skilled outpatient PT [facility] left shoulder pain. Initially began as left bicep pain that over time is now also radiating up into the shoulder. She is right hand dominant. She reports painful episodes over the years most recently 2021. She presents with S/S of impingement and biceps TTP left shoulder, TTP at subacromial bursa, [facility]. Also she has LOM, decrease tolerance to ADLS and activities, and MMT NA due to pain and LOM  Progress towards Goals: patient with only 2 sessions post eval and continues with pain 6/10, FOTO NA. [facility] residual pain and decrease tolerance to hand behind back movements. ADLS and activities limited due to pain.    Goals: to be achieved in 4 weeks:        1 patient will have pain 2/10 to aid with increase tolerance to ADLS and activities at home              PN 6 22               2 patient will have FOTO improved to projected gains of 66 to show increase tolerance to ADLS and activities at home and in the community              PN ongoing NA 6/13/22               3 patient will have MMT 4+ MMG left shoulder to aid with increase tolerance to activities at home like lifting groceries             PN ongoing 6/13/22               4 patient will have Left AROM F 125 for overhead reaching at home            PN ongoing 6/13/22     ASSESSMENT/RECOMMENDATIONS:  [x]Continue therapy per initial plan/protocol at a frequency of  2 x per week for 4 weeks  []Continue therapy with the following recommended changes:_____________________      _____________________________________________________________________  []Discontinue therapy progressing towards or have reached established goals  []Discontinue therapy due to lack of appreciable progress towards goals  []Discontinue therapy due to lack of attendance or compliance  []Await Physician's recommendations/decisions regarding therapy  []Other:________________________________________________________________    Thank you for this referral. Mikayla Vides, PT 6/13/2022 5:15 PM  NOTE TO PHYSICIAN:  PLEASE COMPLETE THE ORDERS BELOW AND   FAX TO [facility]: [phone]  If you are unable to process this request in 24 hours please contact our office: [phone]    ? I have read the above report and request that my patient continue as recommended. ? I have read the above report and request that my patient continue therapy with the following changes/special instructions:_____________________________________  ? I have read the above report and request that my patient be discharged from therapy.     [de-identified] Signature:____________Date:_________TIME:________     Myneni, Noretta Ashish,   ** Signature, Date and Time must be completed for valid certification **

## 2022-06-14 ENCOUNTER — HOSPITAL ENCOUNTER (OUTPATIENT)
Dept: MAMMOGRAPHY | Age: 52
Discharge: HOME | End: 2022-06-14
Attending: FAMILY MEDICINE
Payer: COMMERCIAL

## 2022-06-14 DIAGNOSIS — Z12.31 VISIT FOR SCREENING MAMMOGRAM: ICD-10-CM

## 2022-06-14 PROCEDURE — 77063 BREAST TOMOSYNTHESIS BI: CPT

## 2022-06-15 ENCOUNTER — TELEPHONE (OUTPATIENT)
Dept: PHYSICAL THERAPY | Age: 52
End: 2022-06-15

## 2022-06-20 ENCOUNTER — HOSPITAL ENCOUNTER (OUTPATIENT)
Dept: PHYSICAL THERAPY | Age: 52
Discharge: HOME | End: 2022-06-20
Payer: COMMERCIAL

## 2022-06-20 PROCEDURE — 97140 MANUAL THERAPY 1/> REGIONS: CPT

## 2022-06-20 PROCEDURE — 97110 THERAPEUTIC EXERCISES: CPT

## 2022-06-20 PROCEDURE — 97530 THERAPEUTIC ACTIVITIES: CPT

## 2022-06-20 NOTE — PROGRESS NOTES
PT DAILY TREATMENT NOTE     Patient Name: Mac Rader  Date:2022  : 1970  [x]  Patient  Verified  Payor: Charlene Gaucher / Plan: [place] [employer]  CAPITATED PT / Product Type: Commerical /    In time: 430 pm   Out time: 535 pm   Total Treatment Time (min): 65  Visit #: 3 of 8    Treatment Area: Pain in left shoulder [M25.512]    SUBJECTIVE  Pain Level (0-10 scale): 7/10   Any medication changes, allergies to medications, adverse drug reactions, diagnosis change, or new procedure performed?: [x] No    [] Yes (see summary sheet for update)  Subjective functional status/changes:   [] No changes reported  Patient reports that her shoulder still hurts when going behind her back and reaching forward. OBJECTIVE    Modality rationale: decrease inflammation, decrease pain and increase tissue extensibility to improve the patients ability to assist with performing ADL's and functional tasks without limitations.    Min Type Additional Details    [] Estim:  []Unatt       []IFC  []Premod                        []Other:  []w/ice   []w/heat  Position:  Location:    [] Estim: []Att    []TENS instruct  []NMES                    []Other:  []w/US   []w/ice   []w/heat  Position:  Location:    []  Traction: [] Cervical       []Lumbar                       [] Prone          []Supine                       []Intermittent   []Continuous Lbs:  [] before manual  [] after manual    []  Ultrasound: []Continuous   [] Pulsed                           []1MHz   []3MHz W/cm2:  Location:    []  Iontophoresis with dexamethasone         Location: [] Take home patch   [] In clinic   10 []  Ice     [x]  heat  []  Ice massage  []  Laser   []  Anodyne Position:supine  Location:left shoulder     []  Laser with stim  []  Other:  Position:  Location:    []  Vasopneumatic Device    []  Right     []  Left  Pre-treatment girth:  Post-treatment girth:  Measured at (location):  Pressure:       [] lo [] med [] hi   Temperature: [] lo [] med [] hi   [] Skin assessment post-treatment:  []intact []redness- no adverse reaction  []redness - adverse reaction:     35 min Therapeutic Exercise:  [] See flow sheet :   Rationale: increase ROM and increase strength to improve the patients overall muscle endurance and activity tolerance for ADL's and functional tasks. 10 min Therapeutic Activity:  []  See flow sheet :   Rationale: increase strength and improve coordination  to improve the patients ability to safely perform dynamic activities and to improve functional performance of ADL's.      min Neuromuscular Re-education:  []  See flow sheet :   Rationale: increase strength, improve coordination and improve balance  to improve the patients ability to perform activities with good form, stability and proprioception. 10 min Manual Therapy: STM/TPR to left shoulder musculature; PROM to right shoulder    The manual therapy interventions were performed at a separate and distinct time from the therapeutic activities interventions. Rationale: decrease pain, increase ROM, increase tissue extensibility and decrease trigger points to assist with performing exercises with ease. With   [] TE   [] TA   [] neuro   [] other: Patient Education: [x] Review HEP    [] Progressed/Changed HEP based on:   [] positioning   [] body mechanics   [] transfers   [] heat/ice application    [] other:      Other Objective/Functional Measures: Added TB rows, ext and ER/IR    Left shoulder PROM: Flexion- 145 degs, Abduction-145 degs, ER at 45 degrees- 50 degrees (painful), IR at 45 degrees- 50 degs (painful)    Pain Level (0-10 scale) post treatment: 5-6/10     ASSESSMENT/Changes in Function:   Patient is progressing as expected towards goals. Progressed exercises, as per flow sheet, to assist with increasing left shoulder strength. Patient reported an increase in left shoulder pain with today's new exercises.  Patient still presents with increased shoulder pain reaching behind her back and reaching forward. Elana experienced with ER PROM. Patient will continue to benefit from skilled PT services to modify and progress therapeutic interventions, address functional mobility deficits, address ROM deficits, address strength deficits, analyze and address soft tissue restrictions, analyze and cue movement patterns, analyze and modify body mechanics/ergonomics, assess and modify postural abnormalities and instruct in home and community integration to attain remaining goals.      []  See Plan of Care  []  See progress note/recertification  []  See Discharge Summary         Progress towards goals / Updated goals:    1 patient will have pain 2/10 to aid with increase tolerance to ADLS and activities at home              PN 6 6/13/22                 2 patient will have FOTO improved to projected gains of 66 to show increase tolerance to ADLS and activities at home and in the community              PN ongoing NA 6/13/22     3 patient will have MMT 4+ MMG left shoulder to aid with increase tolerance to activities at home like lifting groceries             PN ongoing 6/13/22    4 patient will have Left AROM F 125 for overhead reaching at home            PN ongoing 6/13/22    PLAN  [x]  Upgrade activities as tolerated     [x]  Continue plan of care  []  Update interventions per flow sheet       []  Discharge due to:_  []  Other:_      Lamont Marino PTA 6/20/2022  11:13 AM    Future Appointments   Date Time Provider Juliocesar Lynch   6/20/2022  4:30 PM Cristian Hernandez PTA [facility]   6/23/2022  6:00 PM Ella Granados PTA [facility]

## 2022-06-23 ENCOUNTER — HOSPITAL ENCOUNTER (OUTPATIENT)
Dept: PHYSICAL THERAPY | Age: 52
Discharge: HOME | End: 2022-06-23
Payer: COMMERCIAL

## 2022-06-23 PROCEDURE — 97140 MANUAL THERAPY 1/> REGIONS: CPT

## 2022-06-23 PROCEDURE — 97110 THERAPEUTIC EXERCISES: CPT

## 2022-06-23 PROCEDURE — 97530 THERAPEUTIC ACTIVITIES: CPT

## 2022-06-23 NOTE — PROGRESS NOTES
PT DAILY TREATMENT NOTE     Patient Name: Evelin Montero  Date:2022  : 1970  [x]  Patient  Verified  Payor: Tasia Dave / [place]: [place] [employer]  [facility] / Product Type: Commerical /    In time: 5:04  Out time:6:02  Total Treatment Time (min): 58  Visit #: 4 of 8    Medicare/BCBS Only   Total Timed Codes (min):   1:1 Treatment Time:         Treatment Area: Pain in left shoulder [M25.512]    SUBJECTIVE  Pain Level (0-10 scale): 4-5  Any medication changes, allergies to medications, adverse drug reactions, diagnosis change, or new procedure performed?: [x] No    [] Yes (see summary sheet for update)  Subjective functional status/changes:   [] No changes reported  \"Not as bad today. \"  OBJECTIVE    Modality rationale: decrease pain and increase tissue extensibility to improve the patients ability to perform ADL   Min Type Additional Details    [] Estim:  []Unatt       []IFC  []Premod                        []Other:  []w/ice   []w/heat  Position:  Location:    [] Estim: []Att    []TENS instruct  []NMES                    []Other:  []w/US   []w/ice   []w/heat  Position:  Location:    []  Traction: [] Cervical       []Lumbar                       [] Prone          []Supine                       []Intermittent   []Continuous Lbs:  [] before manual  [] after manual    []  Ultrasound: []Continuous   [] Pulsed                           []1MHz   []3MHz W/cm2:  Location:    []  Iontophoresis with dexamethasone         Location: [] Take home patch   [] In clinic   10 []  Ice     [x]  heat  []  Ice massage  []  Laser   []  Anodyne Position:semi reclined  Location:left shoulder    []  Laser with stim  []  Other:  Position:  Location:    []  Vasopneumatic Device    []  Right     []  Left  Pre-treatment girth:  Post-treatment girth:  Measured at (location):  Pressure:       [] lo [] med [] hi   Temperature: [] lo [] med [] hi   [x] Skin assessment post-treatment:  [x]intact []redness- no adverse reaction    []redness - adverse reaction:      min []Eval                  []Re-Eval       25 min Therapeutic Exercise:  [x] See flow sheet :   Rationale: increase ROM, increase strength and improve coordination to improve the patients ability to reach overhead with no limitation    13 min Therapeutic Activity:  [x]  See flow sheet :   Rationale: increase ROM, increase strength and improve coordination  to improve the patients ability to perform reach behind back with ease      min Neuromuscular Re-education:  []  See flow sheet :   Rationale: increase ROM, increase strength, improve coordination, improve balance and increase proprioception  to improve the patients ability to perform daily activities    10 min Manual Therapy:  [address] [facility] 3 with distraction at end range all plane semi reclined   The manual therapy interventions were performed at a separate and distinct time from the therapeutic activities interventions. Rationale: decrease pain, increase ROM, increase tissue extensibility, decrease edema , decrease trigger points and increase postural awareness to perform daily activities without difficulty     min Gait Training:  ___ feet with ___ device on level surfaces with ___ level of assist   Rationale: With   [] TE   [] TA   [] neuro   [] other: Patient Education: [x] Review HEP    [] Progressed/Changed HEP based on:   [] positioning   [] body mechanics   [] transfers   [] heat/ice application    [] other:      Other Objective/Functional Measures:      Pain Level (0-10 scale) post treatment: 2-3    ASSESSMENT/Changes in Function: Pt was able to perform standing wand scap at mid range without increased pain. Pt reports difficulty with reaching behind her back . Therapist instruct pt to perform wand IR at home to improve flexibility at left shoulder joint. Overall pt completed each strengthening there ex fairly well with minimal cues on correct form. Pt reports most of her pain is at night.  Pt benefited with treatment due to decrease in pain. Patient will continue to benefit from skilled PT services to address functional mobility deficits, address ROM deficits, address strength deficits, analyze and address soft tissue restrictions, analyze and cue movement patterns, analyze and modify body mechanics/ergonomics, assess and modify postural abnormalities and instruct in home and community integration to attain remaining goals.      [x]  See Plan of Care  []  See progress note/recertification  []  See Discharge Summary         Progress towards goals / Updated goals:   1 patient will have pain 2/10 to aid with increase tolerance to ADLS and activities at home              PN 6 6/13/22              Current:  2/10  6/23/22                 2 patient will have FOTO improved to projected gains of 66 to show increase tolerance to ADLS and activities at home and in the community              PN ongoing NA 6/13/22      3 patient will have MMT 4+ MMG left shoulder to aid with increase tolerance to activities at home like lifting groceries             PN ongoing 6/13/22     4 patient will have Left AROM F 125 for overhead reaching at home            PN ongoing 6/13/22       PLAN  []  Upgrade activities as tolerated     [x]  Continue plan of care  []  Update interventions per flow sheet       []  Discharge due to:_  []  Other:_      James Briggs, PTA 6/23/2022  5:06 PM    Future Appointments   Date Time Provider Juliocesar Lynch   6/23/2022  5:15 PM Summer Milks, PTA [facility]   6/30/2022  5:15 PM Summer Milks, PTA [facility]   7/1/2022  4:30 PM Margene Relic, [facility]   7/6/2022  5:15 PM Margene Relic, PTA [facility]   7/7/2022  5:15 PM Darwin, Crystal, PTA [facility]

## 2022-06-30 ENCOUNTER — HOSPITAL ENCOUNTER (OUTPATIENT)
Dept: PHYSICAL THERAPY | Age: 52
Discharge: HOME | End: 2022-06-30
Payer: COMMERCIAL

## 2022-06-30 PROCEDURE — 97140 MANUAL THERAPY 1/> REGIONS: CPT

## 2022-06-30 PROCEDURE — 97110 THERAPEUTIC EXERCISES: CPT

## 2022-06-30 PROCEDURE — 97035 APP MDLTY 1+ULTRASOUND EA 15: CPT

## 2022-06-30 PROCEDURE — 97530 THERAPEUTIC ACTIVITIES: CPT

## 2022-06-30 NOTE — PROGRESS NOTES
PT DAILY TREATMENT NOTE     Patient Name: Conrado Beltran  Date:2022  : 1970  [x]  Patient  Verified  Payor: Manolo Montgomery / [place]: [place] [employer]  [facility] / Product Type: Commerical /    In time:5:11  Out time:6:15  Total Treatment Time (min): 64  Visit #: 5 of 8    Medicare/BCBS Only   Total Timed Codes (min):   1:1 Treatment Time:         Treatment Area: Pain in left shoulder [M25.512]    SUBJECTIVE  Pain Level (0-10 scale): 5  Any medication changes, allergies to medications, adverse drug reactions, diagnosis change, or new procedure performed?: [x] No    [] Yes (see summary sheet for update)  Subjective functional status/changes:   [] No changes reported  \"Evelyne noticed I can move my arm out to the side but it still hurts. \"    OBJECTIVE    Modality rationale: decrease pain, increase tissue extensibility and increase muscle contraction/control to improve the patients ability to perform work duties   Min Type Additional Details    [] Estim:  []Unatt       []IFC  []Premod                        []Other:  []w/ice   []w/heat  Position:  Location:    [] Estim: []Att    []TENS instruct  []NMES                    []Other:  []w/US   []w/ice   []w/heat  Position:  Location:    []  Traction: [] Cervical       []Lumbar                       [] Prone          []Supine                       []Intermittent   []Continuous Lbs:  [] before manual  [] after manual   8 [x]  Ultrasound: [x]Continuous   [] Pulsed                           [x]1MHz   []3MHz W/cm2:1.3  Location:left bicep    []  Iontophoresis with dexamethasone         Location: [] Take home patch   [] In clinic   10 [x]  Ice     []  heat  []  Ice massage  []  Laser   []  Anodyne Position:semi reclined  Location:left shoulder    []  Laser with stim  []  Other:  Position:  Location:    []  Vasopneumatic Device    []  Right     []  Left  Pre-treatment girth:  Post-treatment girth:  Measured at (location):  Pressure:       [] lo [] med [] hi   Temperature: [] lo [] med [] hi   [x] Skin assessment post-treatment:  [x]intact []redness- no adverse reaction    []redness - adverse reaction:      min []Eval                  []Re-Eval       26 min Therapeutic Exercise:  [x] See flow sheet :   Rationale: increase ROM and increase strength to improve the patients ability to perform daily activities    12 min Therapeutic Activity:  [x]  See flow sheet :   Rationale: increase ROM, increase strength and improve coordination  to improve the patients ability to reach overhead with no difficulty      min Neuromuscular Re-education:  []  See flow sheet :   Rationale: increase ROM, increase strength, improve coordination, improve balance and increase proprioception  to improve the patients ability to  Reach behind back with no limitations    8 min Manual Therapy:  [address] [facility] [employer] [employer] 3 with distraction all plane semi reclined left shoulder   The manual therapy interventions were performed at a separate and distinct time from the therapeutic activities interventions. Rationale: decrease pain, increase ROM, increase tissue extensibility, decrease edema , decrease trigger points and increase postural awareness to perform daily chores     min Gait Training:  ___ feet with ___ device on level surfaces with ___ level of assist   Rationale: With   [] TE   [] TA   [] neuro   [] other: Patient Education: [x] Review HEP    [] Progressed/Changed HEP based on:   [] positioning   [] body mechanics   [] transfers   [] heat/ice application    [] other:      Other Objective/Functional Measures:     Pain Level (0-10 scale) post treatment:  3    ASSESSMENT/Changes in Function: Pt c/o  5/10 pain at biceps with wand IR but was able to perform and with  wand ER. Therapist dicussed with pt on using CP verses MH to decrease pain. Pt understood. Pt benefited with US due to decreased pain . Overall pt completed remaining strengthening there ex well.     Patient will continue to benefit from skilled PT services to address functional mobility deficits, address ROM deficits, address strength deficits, analyze and address soft tissue restrictions, analyze and cue movement patterns, analyze and modify body mechanics/ergonomics, assess and modify postural abnormalities and instruct in home and community integration to attain remaining goals.      [x]  See Plan of Care  []  See progress note/recertification  []  See Discharge Summary         Progress towards goals / Updated goals:   1 patient will have pain 2/10 to aid with increase tolerance to ADLS and activities at home              PN 6 6/13/22              Current:  2/10  6/23/22                 2 patient will have FOTO improved to projected gains of 66 to show increase tolerance to ADLS and activities at home and in the community              PN ongoing NA 6/13/22      3 patient will have MMT 4+ MMG left shoulder to aid with increase tolerance to activities at home like lifting groceries             PN ongoing 6/13/22     4 patient will have Left AROM F 125 for overhead reaching at home            PN ongoing 6/13/22             Current:AROM  160   With minimal pain at left bicep 6/30/22  PLAN  [x]  Upgrade activities as tolerated     []  Continue plan of care  []  Update interventions per flow sheet       []  Discharge due to:_  []  Other:_      Ella Granados PTA 6/30/2022  5:30 PM    Future Appointments   Date Time Provider Juliocesar Lynch   7/1/2022  4:30 PM Ruben Henry PTA [facility]   7/6/2022  5:15 PM Ruben Henry PTA [facility]   7/7/2022  5:15 PM Ella Granados PTA [facility]

## 2022-07-01 ENCOUNTER — HOSPITAL ENCOUNTER (OUTPATIENT)
Dept: PHYSICAL THERAPY | Age: 52
Discharge: HOME | End: 2022-07-01
Payer: COMMERCIAL

## 2022-07-01 PROCEDURE — 97110 THERAPEUTIC EXERCISES: CPT

## 2022-07-01 PROCEDURE — 97530 THERAPEUTIC ACTIVITIES: CPT

## 2022-07-01 PROCEDURE — 97140 MANUAL THERAPY 1/> REGIONS: CPT

## 2022-07-01 PROCEDURE — 97035 APP MDLTY 1+ULTRASOUND EA 15: CPT

## 2022-07-01 NOTE — PROGRESS NOTES
PT DAILY TREATMENT NOTE     Patient Name: Johann Abad  EPFE:[id number]  : 1970  [x]  Patient  Verified  Payor: Vena Duane / Plan: [place] [employer]  CAPITATED PT / Product Type: Commerical /    In time: 428 pm   Out time: 532 pm   Total Treatment Time (min): 64  Visit #: 6 of 8    Treatment Area: Pain in left shoulder [M25.512]    SUBJECTIVE  Pain Level (0-10 scale): 0/10   Any medication changes, allergies to medications, adverse drug reactions, diagnosis change, or new procedure performed?: [x] No    [] Yes (see summary sheet for update)  Subjective functional status/changes:   [] No changes reported  Patient reports that her shoulder felt better after last session. \"I'm not sure if it was the Ultrasound or ice that helped. \"    OBJECTIVE    Modality rationale: decrease inflammation, decrease pain and increase tissue extensibility to improve the patients ability to assist with performing ADL's and functional tasks without limitations.    Min Type Additional Details    [] Estim:  []Unatt       []IFC  []Premod                        []Other:  []w/ice   []w/heat  Position:  Location:    [] Estim: []Att    []TENS instruct  []NMES                    []Other:  []w/US   []w/ice   []w/heat  Position:  Location:    []  Traction: [] Cervical       []Lumbar                       [] Prone          []Supine                       []Intermittent   []Continuous Lbs:  [] before manual  [] after manual   8 []  Ultrasound: [x]Continuous   [] Pulsed                           [x]1MHz   []3MHz W/cm2: 1.2 w/cm2  Location:left bicep     []  Iontophoresis with dexamethasone         Location: [] Take home patch   [] In clinic   10 [x]  Ice     []  heat  []  Ice massage  []  Laser   []  Anodyne Position:supine  Location:left shoulder     []  Laser with stim  []  Other:  Position:  Location:    []  Vasopneumatic Device    []  Right     []  Left  Pre-treatment girth:  Post-treatment girth:  Measured at (location):  Pressure:       [] lo [] med [] hi   Temperature: [] lo [] med [] hi   [] Skin assessment post-treatment:  []intact []redness- no adverse reaction  []redness - adverse reaction:     26 min Therapeutic Exercise:  [] See flow sheet :   Rationale: increase ROM and increase strength to improve the patients overall muscle endurance and activity tolerance for ADL's and functional tasks. 10 min Therapeutic Activity:  []  See flow sheet :   Rationale: increase strength and improve coordination  to improve the patients ability to safely perform dynamic activities and to improve functional performance of ADL's.      min Neuromuscular Re-education:  []  See flow sheet :   Rationale: increase strength, improve coordination and improve balance  to improve the patients ability to perform activities with good form, stability and proprioception. 10 min Manual Therapy: STM/TPR to left shoulder musculature; PROM to right shoulder    The manual therapy interventions were performed at a separate and distinct time from the therapeutic activities interventions. Rationale: decrease pain, increase ROM, increase tissue extensibility and decrease trigger points to assist with performing exercises with ease. With   [] TE   [] TA   [] neuro   [] other: Patient Education: [x] Review HEP    [] Progressed/Changed HEP based on:   [] positioning   [] body mechanics   [] transfers   [] heat/ice application    [] other:      Other Objective/Functional Measures:    Pain Level (0-10 scale) post treatment: 3/10     ASSESSMENT/Changes in Function:   Patient is progressing as expected towards goals. Patient performed exercises, as per flow sheet, to assist with increasing left shoulder strength and stability. Patient tolerated all exercises with no increase in left shoulder pain. Left shoulder ROM continues to improve. Patient responded well to today's session, as evident by, decreased muscle spasms.  Patient will continue to benefit from skilled PT services to modify and progress therapeutic interventions, address functional mobility deficits, address ROM deficits, address strength deficits, analyze and address soft tissue restrictions, analyze and cue movement patterns, analyze and modify body mechanics/ergonomics, assess and modify postural abnormalities and instruct in home and community integration to attain remaining goals. []  See Plan of Care  []  See progress note/recertification  []  See Discharge Summary         Progress towards goals / Updated goals:    1 patient will have pain 2/10 to aid with increase tolerance to ADLS and activities at home              PN 6 6/13/22                 2 patient will have FOTO improved to projected gains of 66 to show increase tolerance to ADLS and activities at home and in the community              PN ongoing NA 6/13/22     3 patient will have MMT 4+ MMG left shoulder to aid with increase tolerance to activities at home like lifting groceries             PN ongoing 6/13/22    4 patient will have Left AROM F 125 for overhead reaching at home            PN ongoing 6/13/22    PLAN  [x]  Upgrade activities as tolerated     [x]  Continue plan of care  []  Update interventions per flow sheet       []  Discharge due to:_  [x]  Other: Progress note, next visit.       Yasmany Sanchez PTA 7/1/2022  11:13 AM    Future Appointments   Date Time Provider Juliocesar Lynch   7/6/2022  5:15 PM Jaimie Foreman PTA [facility]   7/7/2022  5:15 PM Ella Granados PTA [facility]

## 2022-07-06 ENCOUNTER — HOSPITAL ENCOUNTER (OUTPATIENT)
Dept: PHYSICAL THERAPY | Age: 52
Discharge: HOME | End: 2022-07-06
Payer: COMMERCIAL

## 2022-07-06 PROCEDURE — 97110 THERAPEUTIC EXERCISES: CPT

## 2022-07-06 PROCEDURE — 97035 APP MDLTY 1+ULTRASOUND EA 15: CPT

## 2022-07-06 PROCEDURE — 97140 MANUAL THERAPY 1/> REGIONS: CPT

## 2022-07-06 PROCEDURE — 97530 THERAPEUTIC ACTIVITIES: CPT

## 2022-07-06 NOTE — PROGRESS NOTES
PT DAILY TREATMENT NOTE     Patient Name: Tanner Woodson  Date:2022  : 1970  [x]  Patient  Verified  Payor: Yuly Hernandez / Plan: [employer]  [facility] / Product Type: Commerical /    In time: 516 pm   Out time: 618 pm   Total Treatment Time (min): 62  Visit #: 7 of 8    Treatment Area: Pain in left shoulder [M25.512]    SUBJECTIVE  Pain Level (0-10 scale): 0/10   Any medication changes, allergies to medications, adverse drug reactions, diagnosis change, or new procedure performed?: [x] No    [] Yes (see summary sheet for update)  Subjective functional status/changes:   [] No changes reported  Patient reports that her shoulder continues to feel better with the Ultrasound. Patient reports some shoulder soreness today. OBJECTIVE    Modality rationale: decrease inflammation, decrease pain and increase tissue extensibility to improve the patients ability to assist with performing ADL's and functional tasks without limitations.    Min Type Additional Details    [] Estim:  []Unatt       []IFC  []Premod                        []Other:  []w/ice   []w/heat  Position:  Location:    [] Estim: []Att    []TENS instruct  []NMES                    []Other:  []w/US   []w/ice   []w/heat  Position:  Location:    []  Traction: [] Cervical       []Lumbar                       [] Prone          []Supine                       []Intermittent   []Continuous Lbs:  [] before manual  [] after manual   8 []  Ultrasound: [x]Continuous   [] Pulsed                           [x]1MHz   []3MHz W/cm2: 1.2 w/cm2  Location:left bicep     []  Iontophoresis with dexamethasone         Location: [] Take home patch   [] In clinic   10 [x]  Ice     []  heat  []  Ice massage  []  Laser   []  Anodyne Position:supine  Location:left shoulder     []  Laser with stim  []  Other:  Position:  Location:    []  Vasopneumatic Device    []  Right     []  Left  Pre-treatment girth:  Post-treatment girth:  Measured at (location):  Pressure:       [] lo [] med [] hi   Temperature: [] lo [] med [] hi   [] Skin assessment post-treatment:  []intact []redness- no adverse reaction  []redness - adverse reaction:     24 min Therapeutic Exercise:  [] See flow sheet :   Rationale: increase ROM and increase strength to improve the patients overall muscle endurance and activity tolerance for ADL's and functional tasks. 10 min Therapeutic Activity:  []  See flow sheet :   Rationale: increase strength and improve coordination  to improve the patients ability to safely perform dynamic activities and to improve functional performance of ADL's.      min Neuromuscular Re-education:  []  See flow sheet :   Rationale: increase strength, improve coordination and improve balance  to improve the patients ability to perform activities with good form, stability and proprioception. 10 min Manual Therapy: STM/TPR to left shoulder musculature; PROM to right shoulder    The manual therapy interventions were performed at a separate and distinct time from the therapeutic activities interventions. Rationale: decrease pain, increase ROM, increase tissue extensibility and decrease trigger points to assist with performing exercises with ease. With   [] TE   [] TA   [] neuro   [] other: Patient Education: [x] Review HEP    [] Progressed/Changed HEP based on:   [] positioning   [] body mechanics   [] transfers   [] heat/ice application    [] other:      Other Objective/Functional Measures:    Pain Level (0-10 scale) post treatment: 0/10     ASSESSMENT/Changes in Function:   Patient is progressing as expected towards goals. Patient performed exercises, as per flow sheet, to assist with increasing left shoulder strength. Muscle spasms continue to decrease with Ultrasound and manual therapy. Patient tolerated today's exercises well with no increase in left shoulder pain.  Patient will continue to benefit from skilled PT services to modify and progress therapeutic interventions, address functional mobility deficits, address ROM deficits, address strength deficits, analyze and address soft tissue restrictions, analyze and cue movement patterns, analyze and modify body mechanics/ergonomics, assess and modify postural abnormalities and instruct in home and community integration to attain remaining goals.      []  See Plan of Care  []  See progress note/recertification  []  See Discharge Summary         Progress towards goals / Updated goals:    1 patient will have pain 2/10 to aid with increase tolerance to ADLS and activities at home              PN 6 6/13/22   Current: Patient reports 4/10 pain today. (7/6/2022)                  2 patient will have FOTO improved to projected gains of 66 to show increase tolerance to ADLS and activities at home and in the community         PN ongoing NA 6/13/22  Current: Progressing, will assess at next progress note. (7/6/2022)      3 patient will have MMT 4+ MMG left shoulder to aid with increase tolerance to activities at home like lifting groceries     PN ongoing 6/13/22  Current: Progressing, will assess at next progress note. (7/6/2022)       4 patient will have Left AROM F 125 for overhead reaching at home            PN ongoing 6/13/22   Current: Progressing (7/6/2022)   PLAN  [x]  Upgrade activities as tolerated     [x]  Continue plan of care  []  Update interventions per flow sheet       []  Discharge due to:_  []  Other:      Nicolasa Pringle PTA 7/6/2022  11:13 AM    Future Appointments   Date Time Provider Juliocesar Lynch   7/7/2022  5:15 PM Ella Granados PTA [facility]

## 2022-07-07 ENCOUNTER — HOSPITAL ENCOUNTER (OUTPATIENT)
Dept: PHYSICAL THERAPY | Age: 52
Discharge: HOME | End: 2022-07-07
Payer: COMMERCIAL

## 2022-07-07 PROCEDURE — 97035 APP MDLTY 1+ULTRASOUND EA 15: CPT

## 2022-07-07 PROCEDURE — 97140 MANUAL THERAPY 1/> REGIONS: CPT

## 2022-07-07 PROCEDURE — 97530 THERAPEUTIC ACTIVITIES: CPT

## 2022-07-07 PROCEDURE — 97110 THERAPEUTIC EXERCISES: CPT

## 2022-07-07 NOTE — PROGRESS NOTES
.In [address] [place] [address], [address] [place] [address], [address] [place], [place] [address]y [address]  [phone] fax    Progress Note    Patient name: Ann Oro Start of Care: 22   Referral source: Aracelidajuanjeanne Zapata  : 1970   Medical/Treatment Diagnosis: Pain in left shoulder [M25.512]  Payor: Erica Cotto / Plan: [address] PT / Product Type: Commerical /  Onset Date:exacerbation approximately 10/1/21                   Prior Hospitalization: see medical history Provider#: [id number]   Medications: Verified on Patient Summary List      Comorbidities: asthma, DM   Prior Level of Function:  I activities and ADLS with recurrent left shoulder pain, able  work, drive, do household and community activities again all with recurrent left shoulder pain    Visits from Start of Care: 9    Missed Visits: 1    Established Goals:     1 patient will have pain 2/10 to aid with increase tolerance to ADLS and activities at home              PN 6 22              Current: Patient reports 5-6/10 pain today. (2022)                  2 patient will have FOTO improved to projected gains of 66 to show increase tolerance to ADLS and activities at home and in the community         PN ongoing NA 22  Current: 59    (2022)       3 patient will have MMT 4+ MMG left shoulder to aid with increase tolerance to activities at home like lifting groceries     PN ongoing 22  Current:   Shoulder   Flex   4+    ABD 3+/4-   IR  3+/4-  ER  3+/4-. (2022)         4 patient will have Left AROM F 125 for overhead reaching at home            PN ongoing 22              Current:160   (2022)              Key Functional Changes: FOTO 59  Functional Gains: reaching across her body with minimal  and reaching behind her back with moderate pain  Functional Deficits: reaching middle of her back and  External rotation  % improvement: 70%   Pain   Average: 5-6/10                  Best: 3-4/10 Worst: 8-9/10  Patient Goal: \" I will love to be pain free and no surgery\"  Updated Goals: to be achieved in 4 weeks:  1. Pt will be able to reach behind back to fastened bra with minimal to no difficulty      PN   Moderate  Difficulty  2.  Patient will have pain 2/10 to aid with increase tolerance to ADLS and activities at home      PN  5-6/10  3. Patient will have FOTO improved to projected gains of 66 to show increase tolerance to ADLS and activities at home and in the community  PN  59  4. patient will have MMT 4+ MMG left shoulder to aid with increase tolerance to activities at home like lifting groceries  PN Shoulder   Flex   4+    ABD 3+/4-   IR  3+/4-  ER  3+/4-       ASSESSMENT/RECOMMENDATIONS:Pt has shown good progress with PT. FOTO, MMT and AROM has improved since initial eval. Recommend continuation of PT for further strengthening and pain reduction. [x]Continue therapy per initial plan/protocol at a frequency of  2 x per week for 4 weeks  []Continue therapy with the following recommended changes:_____________________      _____________________________________________________________________  []Discontinue therapy progressing towards or have reached established goals  []Discontinue therapy due to lack of appreciable progress towards goals  []Discontinue therapy due to lack of attendance or compliance  []Await Physician's recommendations/decisions regarding therapy  []Other:________________________________________________________________    Thank you for this referral.   Lidya Booker, PTA 7/7/2022 6:24 PM  NOTE TO PHYSICIAN:  PLEASE COMPLETE THE ORDERS BELOW AND   FAX TO [facility]: ([phone]  If you are unable to process this request in 24 hours please contact our office: [phone]    []  I have read the above report and request that my patient continue as recommended.   []  I have read the above report and request that my patient continue therapy with the following changes/special instructions:________________________________________  []I have read the above report and request that my patient be discharged from therapy.     [facility] Signature:____________Date:_________TIME:________     Demetra Arredondo DO  ** Signature, Date and Time must be completed for valid certification **

## 2022-07-07 NOTE — PROGRESS NOTES
PT DAILY TREATMENT NOTE     Patient Name: Jesus Kelsey  Date:2022  : 1970  [x]  Patient  Verified  Payor: Prudence Oiler / Plan: [employer]  [employer] [facility] / Product Type: Commerical /    In time:5:15  Out time:6:27  Total Treatment Time (min):  72  Visit #: 8 of 8    Medicare/BCBS Only   Total Timed Codes (min):  62 1:1 Treatment Time:  55       Treatment Area: Pain in left shoulder [M25.512]    SUBJECTIVE  Pain Level (0-10 scale): 5-6  Any medication changes, allergies to medications, adverse drug reactions, diagnosis change, or new procedure performed?: [x] No    [] Yes (see summary sheet for update)  Subjective functional status/changes:   [] No changes reported  \"I dont know what I did. \"I was feeling good. \"  OBJECTIVE    Modality rationale: decrease pain, increase tissue extensibility and increase muscle contraction/control to improve the patients ability to perform work duties   Min Type Additional Details    [] Estim:  []Unatt       []IFC  []Premod                        []Other:  []w/ice   []w/heat  Position:  Location:    [] Estim: []Att    []TENS instruct  []NMES                    []Other:  []w/US   []w/ice   []w/heat  Position:  Location:    []  Traction: [] Cervical       []Lumbar                       [] Prone          []Supine                       []Intermittent   []Continuous Lbs:  [] before manual  [] after manual   8 [x]  Ultrasound: [x]Continuous   [] Pulsed                           [x]1MHz   []3MHz W/cm2:1.3  Location:left bicep    []  Iontophoresis with dexamethasone         Location: [] Take home patch   [] In clinic   10 [x]  Ice     []  heat  []  Ice massage  []  Laser   []  Anodyne Position: long sit  Location:left shoulder    []  Laser with stim  []  Other:  Position:  Location:    []  Vasopneumatic Device    []  Right     []  Left  Pre-treatment girth:  Post-treatment girth:  Measured at (location):  Pressure:       [] lo [] med [] hi   Temperature: [] lo [] med [] hi   [x] Skin assessment post-treatment:  [x]intact []redness- no adverse reaction    []redness - adverse reaction:      min []Eval                  []Re-Eval       20 min Therapeutic Exercise:  [x] See flow sheet :   Rationale: increase ROM and increase strength to improve the patients ability to reach overhead no difficulty    24 min Therapeutic Activity:  [x]  See flow sheet :   Rationale: increase ROM, increase strength and improve coordination  to improve the patients ability to reach overhead no difficulty      min Neuromuscular Re-education:  []  See flow sheet :   Rationale: increase ROM, increase strength, improve coordination, improve balance and increase proprioception  to improve the patients ability to reach behind back with ease    10 min Manual Therapy: [facility] TJ mob 3 with distraction at end range all plane  Semi reclined left shoulder    The manual therapy interventions were performed at a separate and distinct time from the therapeutic activities interventions. Rationale: decrease pain, increase ROM, increase tissue extensibility, decrease edema , decrease trigger points and increase postural awareness to perform ADL     min Gait Training:  ___ feet with ___ device on level surfaces with ___ level of assist   Rationale:           With   [] TE   [] TA   [] neuro   [] other: Patient Education: [x] Review HEP    [] Progressed/Changed HEP based on:   [] positioning   [] body mechanics   [] transfers   [] heat/ice application    [] other:      Other Objective/Functional Measures:  FOTO  59  Functional Gains: reaching across her body with minimal  and reaching behind her back with moderate pain  Functional Deficits: reaching middle of her back and  External rotation  % improvement: 70%   Pain   Average: 5-6/10       Best: 3-4/10     Worst: 8-9/10  Patient Goal: \" I will love to be pain free and no surgery\"  Pain Level (0-10 scale) post treatment: 3-4  ASSESSMENT/Changes in Function: Pt has shown good progress with PT. FOTO, MMT and AROM has improved since initial eval. Recommend continuation of PT for further strengthening and pain reduction. Patient will continue to benefit from skilled PT services to address functional mobility deficits, address ROM deficits, address strength deficits, analyze and address soft tissue restrictions, analyze and cue movement patterns, analyze and modify body mechanics/ergonomics, assess and modify postural abnormalities and instruct in home and community integration to attain remaining goals.      [x]  See Plan of Care  []  See progress note/recertification  []  See Discharge Summary         Progress towards goals / Updated goals:    1 patient will have pain 2/10 to aid with increase tolerance to ADLS and activities at home              PN 6 6/13/22              Current: Patient reports 5-6/10 pain today. (7/7/2022)                  2 patient will have FOTO improved to projected gains of 66 to show increase tolerance to ADLS and activities at home and in the community         PN ongoing NA 6/13/22  Current: 59    (7/7/2022)       3 patient will have MMT 4+ MMG left shoulder to aid with increase tolerance to activities at home like lifting groceries     PN ongoing 6/13/22  Current:   Shoulder   Flex   4+    ABD 3+/4-   IR  3+/4-  ER  3+/4-. (7/7/2022)         4 patient will have Left AROM F 125 for overhead reaching at home            PN ongoing 6/13/22              Current:160   (7/7/2022)     PLAN  []  Upgrade activities as tolerated     []  Continue plan of care  []  Update interventions per flow sheet       []  Discharge due to:_  [x]  Other:_  FAX MD NOTE    Stephanie Platt PTA 7/7/2022  5:38 PM    Future Appointments   Date Time Provider Juliocesar Lynch   7/13/2022  5:15 PM Leighann Neves PTA [facility]   7/14/2022  5:15 PM Rey Iqbal PTA [facility]   7/20/2022  5:15 PM Leighann Neves PTA [facility]

## 2022-07-13 ENCOUNTER — HOSPITAL ENCOUNTER (OUTPATIENT)
Dept: PHYSICAL THERAPY | Age: 52
Discharge: HOME | End: 2022-07-13
Payer: COMMERCIAL

## 2022-07-13 PROCEDURE — 97140 MANUAL THERAPY 1/> REGIONS: CPT

## 2022-07-13 PROCEDURE — 97035 APP MDLTY 1+ULTRASOUND EA 15: CPT

## 2022-07-13 PROCEDURE — 97110 THERAPEUTIC EXERCISES: CPT

## 2022-07-13 PROCEDURE — 97530 THERAPEUTIC ACTIVITIES: CPT

## 2022-07-13 NOTE — PROGRESS NOTES
PT DAILY TREATMENT NOTE     Patient Name: Rafy Dia  Date:2022  : 1970  [x]  Patient  Verified  Payor: Sejal Marin / Plan: [employer]  [facility] PT / Product Type: Commerical /    In time: 445 pm   Out time: 547 pm   Total Treatment Time (min): 62  Visit #: 1 of 8    Treatment Area: Pain in left shoulder [M25.512]    SUBJECTIVE  Pain Level (0-10 scale): 5/10   Any medication changes, allergies to medications, adverse drug reactions, diagnosis change, or new procedure performed?: [x] No    [] Yes (see summary sheet for update)  Subjective functional status/changes:   [] No changes reported  Patient reports that her shoulder is sore and achy today. Patient explains that she thinks it maybe because of the weather. \"I am not getting the sharp pains I was getting before when I started PT. \"     OBJECTIVE    Modality rationale: decrease inflammation, decrease pain and increase tissue extensibility to improve the patients ability to assist with performing ADL's and functional tasks without limitations.    Min Type Additional Details    [] Estim:  []Unatt       []IFC  []Premod                        []Other:  []w/ice   []w/heat  Position:  Location:    [] Estim: []Att    []TENS instruct  []NMES                    []Other:  []w/US   []w/ice   []w/heat  Position:  Location:    []  Traction: [] Cervical       []Lumbar                       [] Prone          []Supine                       []Intermittent   []Continuous Lbs:  [] before manual  [] after manual   8 []  Ultrasound: [x]Continuous   [] Pulsed                           [x]1MHz   []3MHz W/cm2: 1.2 w/cm2  Location:left bicep     []  Iontophoresis with dexamethasone         Location: [] Take home patch   [] In clinic   10 [x]  Ice     []  heat  []  Ice massage  []  Laser   []  Anodyne Position:supine  Location:left shoulder     []  Laser with stim  []  Other:  Position:  Location:    []  Vasopneumatic Device    []  Right     []  Left  Pre-treatment girth:  Post-treatment girth:  Measured at (location):  Pressure:       [] lo [] med [] hi   Temperature: [] lo [] med [] hi   [] Skin assessment post-treatment:  []intact []redness- no adverse reaction  []redness - adverse reaction:     24 min Therapeutic Exercise:  [] See flow sheet :   Rationale: increase ROM and increase strength to improve the patients overall muscle endurance and activity tolerance for ADL's and functional tasks. 10 min Therapeutic Activity:  []  See flow sheet :   Rationale: increase strength and improve coordination  to improve the patients ability to safely perform dynamic activities and to improve functional performance of ADL's.      min Neuromuscular Re-education:  []  See flow sheet :   Rationale: increase strength, improve coordination and improve balance  to improve the patients ability to perform activities with good form, stability and proprioception. 10 min Manual Therapy: STM/TPR to left shoulder musculature; PROM to right shoulder    The manual therapy interventions were performed at a separate and distinct time from the therapeutic activities interventions. Rationale: decrease pain, increase ROM, increase tissue extensibility and decrease trigger points to assist with performing exercises with ease. With   [] TE   [] TA   [] neuro   [] other: Patient Education: [x] Review HEP    [] Progressed/Changed HEP based on:   [] positioning   [] body mechanics   [] transfers   [] heat/ice application    [] other:      Other Objective/Functional Measures: Added serratus punch with dowel    Pain Level (0-10 scale) post treatment: 0/10     ASSESSMENT/Changes in Function:   Patient is progressing as expected towards goals. Progressed  exercises, as per flow sheet, to assist with increasing left shoulder strength. No pain noted with IR stretch today. Decreased muscle spasms present at left biceps and deltoid.  Patient tolerated today's exercises well with no increase in left shoulder pain. Patient will continue to benefit from skilled PT services to modify and progress therapeutic interventions, address functional mobility deficits, address ROM deficits, address strength deficits, analyze and address soft tissue restrictions, analyze and cue movement patterns, analyze and modify body mechanics/ergonomics, assess and modify postural abnormalities and instruct in home and community integration to attain remaining goals. []  See Plan of Care  []  See progress note/recertification  []  See Discharge Summary         Progress towards goals / Updated goals:  1. Pt will be able to reach behind back to fastened bra with minimal to no difficulty      PN   Moderate  Difficulty  Current: Progressing (7/13/2022)     2.  Patient will have pain 2/10 to aid with increase tolerance to ADLS and activities at home      PN  5-6/10  Current: Patient reports 5/10 pain today. (7/13/2022)     3. Patient will have FOTO improved to projected gains of 66 to show increase tolerance to ADLS and activities at home and in the community  PN  59  Current:Progressing, will assess at next progress note. (7/13/2022)     4.patient will have MMT 4+ MMG left shoulder to aid with increase tolerance to activities at home like lifting groceries  PN Shoulder   Flex   4+    ABD 3+/4-   IR  3+/4-  ER  3+/4-   Current:Progressing, will assess at next progress note.  (7/13/2022)        PLAN  [x]  Upgrade activities as tolerated     [x]  Continue plan of care  []  Update interventions per flow sheet       []  Discharge due to:_  []  Other:      Bandar Cortez PTA 7/13/2022  11:13 AM    Future Appointments   Date Time Provider Juliocesar Lynch   7/13/2022  5:15 PM Hussein Garcia PTA [facility]   7/14/2022  5:15 PM Artemio Gray PTA [facility]   7/20/2022  5:15 PM Hussein Garcia PTA [facility]

## 2022-07-14 ENCOUNTER — HOSPITAL ENCOUNTER (OUTPATIENT)
Dept: PHYSICAL THERAPY | Age: 52
Discharge: HOME | End: 2022-07-14
Payer: COMMERCIAL

## 2022-07-14 PROCEDURE — 97530 THERAPEUTIC ACTIVITIES: CPT

## 2022-07-14 PROCEDURE — 97140 MANUAL THERAPY 1/> REGIONS: CPT

## 2022-07-14 NOTE — PROGRESS NOTES
PT DAILY TREATMENT NOTE     Patient Name: Alanis Chadwick  Date:2022  : 1970  [x]  Patient  Verified  Payor: [employer] / Plan: [facility] [employer]  [employer] / Product Type: Commerical /    In time: 5:18  Out time: 6:20  Total Treatment Time (min): 70  Visit #: 2 of 8    Medicare/BCBS Only   Total Timed Codes (min): 60 1:1 Treatment Time:  30       Treatment Area: Pain in left shoulder [M25.512]    SUBJECTIVE  Pain Level (0-10 scale): 6  Any medication changes, allergies to medications, adverse drug reactions, diagnosis change, or new procedure performed?: [x] No    [] Yes (see summary sheet for update)  Subjective functional status/changes:   [] No changes reported  \"I didn't sleep well last night. \"\"No sure what happen last night. \"    OBJECTIVE    Modality rationale: decrease pain and increase tissue extensibility to improve the patients ability to perform job duties   Min Type Additional Details    [] Estim:  []Unatt       []IFC  []Premod                        []Other:  []w/ice   []w/heat  Position:  Location:    [] Estim: []Att    []TENS instruct  []NMES                    []Other:  []w/US   []w/ice   []w/heat  Position:  Location:    []  Traction: [] Cervical       []Lumbar                       [] Prone          []Supine                       []Intermittent   []Continuous Lbs:  [] before manual  [] after manual   8 [x]  Ultrasound: [x]Continuous   [] Pulsed                           [x]1MHz   []3MHz W/cm2:1.3  Location:left bicep    []  Iontophoresis with dexamethasone         Location: [] Take home patch   [] In clinic   10 [x]  Ice     []  heat  []  Ice massage  []  Laser   []  Anodyne Position:semi reclined  Location:left shoulder    []  Laser with stim  []  Other:  Position:  Location:    []  Vasopneumatic Device    []  Right     []  Left  Pre-treatment girth:  Post-treatment girth:  Measured at (location):  Pressure:       [] lo [] med [] hi   Temperature: [] lo [] med [] hi   [x] Skin assessment post-treatment:  [x]intact []redness- no adverse reaction    []redness - adverse reaction:      min []Eval                  []Re-Eval       29 min Therapeutic Exercise:  [x] See flow sheet :   Rationale: increase ROM and increase strength to improve the patients ability to reach overhead    13 min Therapeutic Activity:  [x]  See flow sheet :   Rationale: increase ROM, increase strength and improve coordination  to improve the patients ability to reach overhead with no difficulty      min Neuromuscular Re-education:  []  See flow sheet :   Rationale: increase ROM, increase strength, improve coordination, improve balance and increase proprioception  to improve the patients ability to     10 min Manual Therapy:  [facility] mob 3 with distraction all plane semi reclined left shoulder   The manual therapy interventions were performed at a separate and distinct time from the therapeutic activities interventions. Rationale: decrease pain, increase ROM, increase tissue extensibility, decrease edema , decrease trigger points and increase postural awareness to perform ADL      min Gait Training:  ___ feet with ___ device on level surfaces with ___ level of assist   Rationale: With   [] TE   [] TA   [] neuro   [] other: Patient Education: [x] Review HEP    [] Progressed/Changed HEP based on:   [] positioning   [] body mechanics   [] transfers   [] heat/ice application    [] other:      Other Objective/Functional Measures:      Pain Level (0-10 scale) post treatment: 3    ASSESSMENT/Changes in Function: Pt completed each strengthening there ex well today with mod pain at left bicep. Pt resents with.mod tightness at ~ 150 degrees Flexion and ER. Pt was TTP at left bicep during manual but was able to tolerate. Pt benefited with US due to decreased pain.     Patient will continue to benefit from skilled PT services to address functional mobility deficits, address ROM deficits, address strength deficits, analyze and address soft tissue restrictions, analyze and cue movement patterns, analyze and modify body mechanics/ergonomics, assess and modify postural abnormalities and instruct in home and community integration to attain remaining goals. []  See Plan of Care  [x]  See progress note/recertification  []  See Discharge Summary         Progress towards goals / Updated goals:  1. Pt will be able to reach behind back to fastened bra with minimal to no difficulty      PN   Moderate  Difficulty  Current: Progressing (7/13/2022)      2.  Patient will have pain 2/10 to aid with increase tolerance to ADLS and activities at home      PN  5-6/10  Current: Patient reports 6/10 pain today. (7/14/2022)      3. Patient will have FOTO improved to projected gains of 66 to show increase tolerance to ADLS and activities at home and in the community  PN  59  Current:Progressing, will assess at next progress note. (7/13/2022)      4.patient will have MMT 4+ MMG left shoulder to aid with increase tolerance to activities at home like lifting groceries  PN Shoulder   Flex   4+    ABD 3+/4-   IR  3+/4-  ER  3+/4-   Current:Progressing, will assess at next progress note.  (7/13/2022)     PLAN  []  Upgrade activities as tolerated     [x]  Continue plan of care  []  Update interventions per flow sheet       []  Discharge due to:_  []  Other:_      Ella Granados PTA 7/14/2022  5:30 PM    Future Appointments   Date Time Provider Juliocesar Lynch   7/20/2022  5:15 PM Olga Lin PTA [facility]   7/21/2022  5:15 PM Vangie Singh, JARVIST [facility]   7/27/2022  5:15 PM Vangie Singh, DPT [facility]   7/28/2022  5:15 PM Ella Granados PTA [facility]

## 2022-07-20 ENCOUNTER — APPOINTMENT (OUTPATIENT)
Dept: PHYSICAL THERAPY | Age: 52
End: 2022-07-20
Payer: COMMERCIAL

## 2022-07-21 ENCOUNTER — TELEPHONE (OUTPATIENT)
Dept: PHYSICAL THERAPY | Age: 52
End: 2022-07-21

## 2022-07-21 ENCOUNTER — APPOINTMENT (OUTPATIENT)
Dept: PHYSICAL THERAPY | Age: 52
End: 2022-07-21
Payer: COMMERCIAL

## 2022-07-27 ENCOUNTER — HOSPITAL ENCOUNTER (OUTPATIENT)
Dept: PHYSICAL THERAPY | Age: 52
Discharge: HOME | End: 2022-07-27
Payer: COMMERCIAL

## 2022-07-27 PROCEDURE — 97110 THERAPEUTIC EXERCISES: CPT

## 2022-07-27 PROCEDURE — 97035 APP MDLTY 1+ULTRASOUND EA 15: CPT

## 2022-07-27 PROCEDURE — 97140 MANUAL THERAPY 1/> REGIONS: CPT

## 2022-07-27 NOTE — PROGRESS NOTES
PT DAILY TREATMENT NOTE     Patient Name: Meredith Abbasi  Date:2022  : 1970  [x]  Patient  Verified  Payor: Maria Dolores Aquino / Plan: [facility] [employer]  [employer] / Product Type: Commerical /    In time: 5:15P   Out time:  6:26P   Total Treatment Time (min):  71  Visit #: 3 of 8    Treatment Area: Pain in left shoulder [M25.512]    SUBJECTIVE  Pain Level (0-10 scale): 4/10   Any medication changes, allergies to medications, adverse drug reactions, diagnosis change, or new procedure performed?: [x] No    [] Yes (see summary sheet for update)  Subjective functional status/changes:   [] No changes reported  Patient reports overall doing well today. Continued limitations reported include reaching bra strap behind back. OBJECTIVE    Modality rationale: decrease inflammation, decrease pain and increase tissue extensibility to improve the patients ability to assist with performing ADL's and functional tasks without limitations.    Min Type Additional Details    [] Estim:  []Unatt       []IFC  []Premod                        []Other:  []w/ice   []w/heat  Position:  Location:    [] Estim: []Att    []TENS instruct  []NMES                    []Other:  []w/US   []w/ice   []w/heat  Position:  Location:    []  Traction: [] Cervical       []Lumbar                       [] Prone          []Supine                       []Intermittent   []Continuous Lbs:  [] before manual  [] after manual   8 []  Ultrasound: [x]Continuous   [] Pulsed                           [x]1MHz   []3MHz W/cm2: 1.2 w/cm2  Location:left bicep     []  Iontophoresis with dexamethasone         Location: [] Take home patch   [] In clinic   10 [x]  Ice     []  heat  []  Ice massage  []  Laser   []  Anodyne Position:supine  Location:left shoulder     []  Laser with stim  []  Other:  Position:  Location:    []  Vasopneumatic Device    []  Right     []  Left  Pre-treatment girth:  Post-treatment girth:  Measured at (location):  Pressure:       [] lo [] med [] hi Temperature: [] lo [] med [] hi   [] Skin assessment post-treatment:  []intact []redness- no adverse reaction  []redness - adverse reaction:     40 min Therapeutic Exercise:  [x] See flow sheet :   Rationale: increase ROM and increase strength to improve the patients overall muscle endurance and activity tolerance for ADL's and functional tasks. 13 min Manual Therapy: STM to left proximal bicep and middle delt; Gr II-III AP/PA GH mobs w/ PROM in all planes    The manual therapy interventions were performed at a separate and distinct time from the therapeutic activities interventions. Rationale: decrease pain, increase ROM, increase tissue extensibility and decrease trigger points to assist with performing exercises with greater ease and overall return to PLOF . With   [x] TE   [] TA   [] neuro   [] other: Patient Education: [x] Review HEP    [] Progressed/Changed HEP based on:   [] positioning   [] body mechanics   [] transfers   [] heat/ice application    [] other:      Other Objective/Functional Measures:  Continued with current ex program per flow sheet      Pain Level (0-10 scale) post treatment: 0/10     ASSESSMENT/Changes in Function:   Pt tolerates session well w/ observable improvements in left shld IR mobility throughout continuation of tx. Occasional cuing required for improved technique and form, otherwise positive response to session evident through report of a reduction in pain levels post session. Patient will continue to benefit from skilled PT services to modify and progress therapeutic interventions, address functional mobility deficits, address ROM deficits, address strength deficits, analyze and address soft tissue restrictions, analyze and cue movement patterns, analyze and modify body mechanics/ergonomics, assess and modify postural abnormalities and instruct in home and community integration to attain remaining goals.      [x]  See Plan of Care  []  See progress note/recertification  []  See Discharge Summary         Progress towards goals / Updated goals:  1. Pt will be able to reach behind back to fastened bra with minimal to no difficulty      PN   Moderate  Difficulty  Current: Progressing (7/13/2022)   2. Patient will have pain 2/10 to aid with increase tolerance to ADLS and activities at home      PN  5-6/10  Current: Patient reports 6/10 pain today. (7/14/2022)   3. Patient will have FOTO improved to projected gains of 66 to show increase tolerance to ADLS and activities at home and in the community  PN  59  Current:Progressing, will assess at next progress note. (7/27/2022)   4.patient will have MMT 4+ MMG left shoulder to aid with increase tolerance to activities at home like lifting groceries  PN Shoulder   Flex   4+    ABD 3+/4-   IR  3+/4-  ER  3+/4-   Current:Progressing, will assess at next progress note.  (7/13/2022)     PLAN  [x]  Upgrade activities as tolerated     [x]  Continue plan of care  [x]  Update interventions per flow sheet       []  Discharge due to:_  []  Other:      Tess Horner DPT 7/27/2022  11:13 AM    Future Appointments   Date Time Provider Juliocesar Lynch   7/28/2022  5:15 PM Ella Granados PTA [facility]

## 2022-07-28 ENCOUNTER — APPOINTMENT (OUTPATIENT)
Dept: PHYSICAL THERAPY | Age: 52
End: 2022-07-28
Payer: COMMERCIAL

## 2022-07-28 ENCOUNTER — TELEPHONE (OUTPATIENT)
Dept: PHYSICAL THERAPY | Age: 52
End: 2022-07-28

## 2022-08-02 ENCOUNTER — APPOINTMENT (OUTPATIENT)
Dept: PHYSICAL THERAPY | Age: 52
End: 2022-08-02

## 2022-08-31 NOTE — PROGRESS NOTES
[facility] [address], [address] [place] [address], [address] [place] [address]  [phone] fax    Discharge Summary  Patient name: Karson Tomas Start of Care: 22   Referral source: Gerhardt Credit, DO : 1970   Medical/Treatment Diagnosis: Pain in left shoulder [M25.512]  Payor: Cezar Loyola / Plan: [address] PT / Product Type: Commerical /  Onset Date:exacerbation approximately 10/1/21                   Prior Hospitalization: see medical history Provider#: [id number]   Medications: Verified on Patient Summary List      Comorbidities: asthma, DM   Prior Level of Function:  I activities and ADLS with recurrent left shoulder pain, able  work, drive, do household and community activities again all with recurrent left shoulder pain     Visits from Start of Care: 12  Missed Visits: 3  Reporting Period : 2022 to 2022      Summary of Care:  Goal:Pt will be able to reach behind back to fastened bra with minimal to no difficulty  Status at last note/certification:Moderate  Difficulty  Status at discharge: not met    Goal:Patient will have pain 2/10 to aid with increase tolerance to ADLS and activities at home  Status at last note/certification:  -  Status at discharge: not met    Goal:Patient will have FOTO improved to projected gains of 66 to show increase tolerance to ADLS and activities at home and in the community  Status at last note/certification:59  Status at discharge: not met    Goal:patient will have MMT 4+ MMG left shoulder to aid with increase tolerance to activities at home like lifting groceries  PN Shoulder   Flex   4+    ABD 3+/4-   IR  3+/4-  ER  3+/4-  Status at last note/certification:Progressing, will assess at next progress note. Status at discharge: not met      ASSESSMENT/RECOMMENDATIONS:  Patient was progressing as expected with PT.  Patient requested to be placed on hold due to awaiting results of MRI.    []Discontinue therapy progressing towards or have reached established goals  []Discontinue therapy due to lack of appreciable progress towards goals  []Discontinue therapy due to lack of attendance or compliance  [x]Other: Patient discharged due to awaiting results of MRI.      Thank you for this referral.     Danielle Lainez, NA 8/31/2022 2:05 PM

## 2022-10-04 ENCOUNTER — OFFICE VISIT (OUTPATIENT)
Dept: ORTHOPEDIC SURGERY | Age: 52
End: 2022-10-04
Payer: COMMERCIAL

## 2022-10-04 ENCOUNTER — HOSPITAL ENCOUNTER (OUTPATIENT)
Dept: OCCUPATIONAL MEDICINE | Age: 52
Discharge: HOME | End: 2022-10-04
Attending: FAMILY MEDICINE

## 2022-10-04 VITALS — RESPIRATION RATE: 14 BRPM | HEIGHT: 64 IN | WEIGHT: 205 LBS | BODY MASS INDEX: 35 KG/M2

## 2022-10-04 DIAGNOSIS — M75.41 SHOULDER IMPINGEMENT, RIGHT: Primary | ICD-10-CM

## 2022-10-04 DIAGNOSIS — M99.01 CERVICAL SOMATIC DYSFUNCTION: ICD-10-CM

## 2022-10-04 DIAGNOSIS — M99.03 LUMBAR REGION SOMATIC DYSFUNCTION: ICD-10-CM

## 2022-10-04 DIAGNOSIS — M99.09 SOMATIC DYSFUNCTION OF ABDOMINAL REGION: ICD-10-CM

## 2022-10-04 DIAGNOSIS — M99.04 SACRAL REGION SOMATIC DYSFUNCTION: ICD-10-CM

## 2022-10-04 DIAGNOSIS — M75.22 BICEPS TENDINITIS OF LEFT SHOULDER: ICD-10-CM

## 2022-10-04 DIAGNOSIS — M99.07 UPPER EXTREMITY SOMATIC DYSFUNCTION: ICD-10-CM

## 2022-10-04 DIAGNOSIS — M99.06 LOWER LIMB REGION SOMATIC DYSFUNCTION: ICD-10-CM

## 2022-10-04 DIAGNOSIS — M75.41 SHOULDER IMPINGEMENT, RIGHT: ICD-10-CM

## 2022-10-04 DIAGNOSIS — M99.05 PELVIC SOMATIC DYSFUNCTION: ICD-10-CM

## 2022-10-04 DIAGNOSIS — M99.08 RIB CAGE REGION SOMATIC DYSFUNCTION: ICD-10-CM

## 2022-10-04 DIAGNOSIS — M99.02 THORACIC REGION SOMATIC DYSFUNCTION: ICD-10-CM

## 2022-10-04 PROCEDURE — 99204 OFFICE O/P NEW MOD 45 MIN: CPT | Performed by: FAMILY MEDICINE

## 2022-10-04 PROCEDURE — 98929 OSTEOPATH MANJ 9-10 REGIONS: CPT | Performed by: FAMILY MEDICINE

## 2022-10-04 RX ORDER — MELOXICAM 15 MG/1
TABLET ORAL
Qty: 30 TABLET | Refills: 1 | Status: SHIPPED | OUTPATIENT
Start: 2022-10-04 | End: 2022-11-01 | Stop reason: SDUPTHER

## 2022-10-04 RX ORDER — MELOXICAM 15 MG/1
TABLET ORAL
COMMUNITY
Start: 2022-09-15 | End: 2022-10-04 | Stop reason: SDUPTHER

## 2022-10-04 RX ORDER — SEMAGLUTIDE 1.34 MG/ML
INJECTION, SOLUTION SUBCUTANEOUS
COMMUNITY
Start: 2022-09-26

## 2022-10-04 RX ORDER — PREDNISONE 20 MG/1
TABLET ORAL
Qty: 28 TABLET | Refills: 0 | Status: SHIPPED | OUTPATIENT
Start: 2022-10-04

## 2022-10-04 NOTE — PROGRESS NOTES
HISTORY OF PRESENT ILLNESS    Connor Rayo 1970 is a 50y.o. year old female comes in today as new patient for: left shoulder/arm/rib pain    Patients symptoms have been present for 1+ year and bad the last several weeks. Pain level 8/10 left bicep and in to left chest. It has slightly improved with PT finished [id number]. Patient has tried:  mobic with benefit. It is described as achy pain in shoulder/arm w/ certain motions. KT tape helps. Pillow under left armpit helps sleep. IMAGING: XR left shoulder pending    Past Surgical History:   Procedure Laterality Date    HX  SECTION      HX CHOLECYSTECTOMY      HX GYN  2010    essure    HX HEENT      sinus surgery    [place] MYOMECTOMY 1-4,W/TOT 250GMS/<,ABD APPRCH  , , 2007    x3    [place] REMOVAL GALLBLADDER  1998     Social History     Socioeconomic History    Marital status:    Tobacco Use    Smoking status: Never    Smokeless tobacco: Never   Substance and Sexual Activity    Alcohol use: No    Drug use: No    Sexual activity: Yes     Partners: Male     Birth control/protection: Surgical      Current Outpatient Medications   Medication Sig Dispense Refill    Ozempic 1 mg/dose (4 mg/3 mL) pnij INJECT 1 MG SUBCUTANEOUSLY EVERY WEEK      meloxicam (MOBIC) 15 mg tablet TAKE 1 TABLET BY MOUTH ONCE A DAY WITH FOOD      montelukast (SINGULAIR) 10 mg tablet Take 1 Tab by mouth daily. 30 Tab 2    albuterol (PROVENTIL HFA, VENTOLIN HFA, PROAIR HFA) 90 mcg/actuation inhaler Take 2 Puffs by inhalation every six (6) hours as needed for Wheezing. 3 Inhaler 3    fluticasone-salmeterol (ADVAIR DISKUS) 250-50 mcg/dose diskus inhaler Take 1 Puff by inhalation every twelve (12) hours. 3 Inhaler 3    beclomethasone (BECONASE AQ) 42 mcg (0.042 %) nasal spray 1 Puff by Both Nostrils route two (2) times a day.  30 g 3    albuterol (PROVENTIL VENTOLIN) 2.5 mg /3 mL (0.083 %) nebulizer solution 1.5 mL by Nebulization route every four (4) hours as needed for Wheezing. 24 Each 5     Past Medical History:   Diagnosis Date    Asthma     Diabetes ([facility] [address]     Fibroid, uterine      Family History   Problem Relation Age of Onset    Cancer Father     Diabetes Father     Hypertension Father     Diabetes Maternal Grandmother     Cancer Maternal Grandfather     Diabetes Paternal Grandmother     Cancer Paternal Grandfather     No Known Problems Mother     No Known Problems Brother          ROS:  No numb, tingle. Some weakness left arm. Objective:  Resp 14   Ht 5' 4\" (1.626 m)   Wt 205 lb (93 kg)   BMI 35.19 kg/m²   NEURO:  Sensation intact light touch B/L upper extremities. right hand dominant. DTRs normal biceps and triceps   M/S:  Shoulder ROM Decreased left. Spurling's negative bilaterally  left Shoulder:  Empty can positive External rotation negative. Internal rotation negative. [place] negative. SLAP negative. Load and Shift +1 Anterior, 1 Posterior. Strength +5/5 bilaterally upper extremities. Crossover test negative. Negative atrophy bilaterally. Negative TTP at [facility] [place], operated by [facility] joint. Apprehension test negative. Vallejo-Kb Test positive. Yergason's test negative. Speed's test positive. Serratus anterior No  TTP. Drop arm negative  Slump negative. Standing flexion test positive right  Sphinx test positive right. ASIS low right  Iliac crests equal bilaterally Pubes equal bilaterally Medial malleolus low right  Sacral base posterior left  SOHAM low left  TTA at C3, 5 on left worse flexion, T2, 3, 5 on right worse flexion, and L2, 3 on left worse flexion  Rib(s) 2, 3, 4 TTP right and posterior LE Strength +5/5 bilaterally Thoracic diaphragm restricted right. Scapula motion restricted w/ TTA left. Hip flexion limited bilaterally. Assessment/Plan:     ICD-10-CM ICD-9-CM    1. Shoulder impingement, right  M75.41 726.2 XR SHOULDER LT AP/LAT MIN 2 V      meloxicam (MOBIC) 15 mg tablet      predniSONE (DELTASONE) 20 mg tablet      2.  Biceps tendinitis of left shoulder M75.22 726.12 XR SHOULDER LT AP/LAT MIN 2 V      meloxicam (MOBIC) 15 mg tablet      predniSONE (DELTASONE) 20 mg tablet      3. Lumbar region somatic dysfunction  M99.03 739.3 DE OSTEOPATHIC MANIP,9-10 BODY REGN      4. Pelvic somatic dysfunction  M99.05 739.5 DE OSTEOPATHIC MANIP,9-10 BODY REGN      5. Sacral region somatic dysfunction  M99.04 739.4 DE OSTEOPATHIC MANIP,9-10 BODY REGN      6. Thoracic region somatic dysfunction  M99.02 739.2 DE OSTEOPATHIC MANIP,9-10 BODY REGN      7. Rib cage region somatic dysfunction  M99.08 739.8 DE OSTEOPATHIC MANIP,9-10 BODY REGN      8. Cervical somatic dysfunction  M99.01 739.1 DE OSTEOPATHIC MANIP,9-10 BODY REGN      9. Upper extremity somatic dysfunction  M99.07 739.7 DE OSTEOPATHIC MANIP,9-10 BODY REGN      10. Lower limb region somatic dysfunction  M99.06 739.6 DE OSTEOPATHIC MANIP,9-10 BODY REGN      11. Somatic dysfunction of abdominal region  M99.09 739.9 [address]          Patient (or guardian if minor) verbalizes understanding of evaluation and plan. Verbal consent obtained. Cervical, Thoracic, Rib, Lumbar, Pelvic, Sacral, Upper Ext, Lower Ext, and Abdominal SD treated with myofascial and ME. Correction of previous malalignments verified after Tx. Pt tolerated well. Notes improvement of Sx and pain is now rated 2/10. HEP/stretches daily. Discussed stretching/strengthening/posture. Will start HEP and Rx for prednisone and mobic PRN  as above and plan follow-up 4 weeks. Consider injections/PT then if indicated. Total time spent on encounter including chart/imaging/lab review and evaluation/documentation/demo home program/coordination of care/form completion but not including time for any procedures/manipulation 51 minutes.

## 2022-10-04 NOTE — PATIENT INSTRUCTIONS
Search YouTube for my channel:    Dr. Alexandria Randall cuff    Biceps stretch    Stand and hold your affected arm out to the side, with your hand at about hip level. Gently bend your wrist back so that your fingers point down toward the floor. You may also do this next to a wall and rest your fingers on the wall. For more of a stretch, bend your head to the opposite side of your affected arm. Hold for 15 to 30 seconds. Repeat 2 to 4 times.

## 2022-10-04 NOTE — LETTER
10/4/2022    Patient: Axel Corona   YOB: 1970   Date of Visit: 10/4/2022     [facility],Building [facility], [address] [place] [address] Drive [address] [place] [phone]  Via Fax: [phone]    Dear [facility],[place] [address], DO,      Thank you for referring Ms. Kd Malave to [place] [address]. for evaluation. My notes for this consultation are attached. If you have questions, please do not hesitate to call me. I look forward to following your patient along with you.       Sincerely,    Renan Reynoso, DO

## 2022-11-01 ENCOUNTER — OFFICE VISIT (OUTPATIENT)
Dept: ORTHOPEDIC SURGERY | Age: 52
End: 2022-11-01
Payer: COMMERCIAL

## 2022-11-01 VITALS — WEIGHT: 204 LBS | HEIGHT: 64 IN | RESPIRATION RATE: 14 BRPM | BODY MASS INDEX: 34.83 KG/M2

## 2022-11-01 DIAGNOSIS — M99.02 THORACIC REGION SOMATIC DYSFUNCTION: ICD-10-CM

## 2022-11-01 DIAGNOSIS — M99.06 LOWER LIMB REGION SOMATIC DYSFUNCTION: ICD-10-CM

## 2022-11-01 DIAGNOSIS — M99.03 LUMBAR REGION SOMATIC DYSFUNCTION: ICD-10-CM

## 2022-11-01 DIAGNOSIS — M75.22 BICEPS TENDINITIS OF LEFT SHOULDER: ICD-10-CM

## 2022-11-01 DIAGNOSIS — M99.09 SOMATIC DYSFUNCTION OF ABDOMINAL REGION: ICD-10-CM

## 2022-11-01 DIAGNOSIS — M75.41 SHOULDER IMPINGEMENT, RIGHT: ICD-10-CM

## 2022-11-01 DIAGNOSIS — M99.08 RIB CAGE REGION SOMATIC DYSFUNCTION: ICD-10-CM

## 2022-11-01 DIAGNOSIS — M99.05 PELVIC SOMATIC DYSFUNCTION: ICD-10-CM

## 2022-11-01 DIAGNOSIS — M99.01 CERVICAL SOMATIC DYSFUNCTION: ICD-10-CM

## 2022-11-01 DIAGNOSIS — M99.04 SACRAL REGION SOMATIC DYSFUNCTION: ICD-10-CM

## 2022-11-01 DIAGNOSIS — M75.41 SHOULDER IMPINGEMENT, RIGHT: Primary | ICD-10-CM

## 2022-11-01 DIAGNOSIS — M99.07 UPPER EXTREMITY SOMATIC DYSFUNCTION: ICD-10-CM

## 2022-11-01 PROCEDURE — 99214 OFFICE O/P EST MOD 30 MIN: CPT | Performed by: FAMILY MEDICINE

## 2022-11-01 PROCEDURE — 98929 OSTEOPATH MANJ 9-10 REGIONS: CPT | Performed by: FAMILY MEDICINE

## 2022-11-01 RX ORDER — MELOXICAM 15 MG/1
TABLET ORAL
Qty: 60 TABLET | Refills: 1 | Status: SHIPPED | OUTPATIENT
Start: 2022-11-01

## 2022-11-01 NOTE — LETTER
11/1/2022    Patient: Martha Anderson   YOB: 1970   Date of Visit: 11/1/2022     [facility],Building [facility], [address] [place] [address] Drive [address] [place] [phone]  Via Fax: [phone]    Dear [facility],[place] [address], DO,      Thank you for referring Ms. Latrell Alegria to [facility]. for evaluation. My notes for this consultation are attached. If you have questions, please do not hesitate to call me. I look forward to following your patient along with you.       Sincerely,    Manolo Mitchell, DO

## 2022-11-01 NOTE — PROGRESS NOTES
HISTORY OF PRESENT ILLNESS    Chemo Moulton 1970 is a 50y.o. year old female comes in today to be evaluated and treated for: left shoulder pain    Since last appt has noticed pain improved minimal with HEP but still significant. Pain level 4/10. Using prednisone and mobic with benefit. Now able to reach behind her back. Finished PT [id number] 6+ weeks. IMAGING: XR left shoulder 10/4/2022  IMPRESSION  1. No acute fracture-dislocation. Past Surgical History:   Procedure Laterality Date    HX  SECTION      HX CHOLECYSTECTOMY      HX GYN  2010    essure    HX HEENT      sinus surgery    [facility] MYOMECTOMY 1-4,W/TOT 250GMS/<,ABD APPRCH  , , 2007    x3    [facility] REMOVAL GALLBLADDER       Social History     Socioeconomic History    Marital status:    Tobacco Use    Smoking status: Never    Smokeless tobacco: Never   Substance and Sexual Activity    Alcohol use: No    Drug use: No    Sexual activity: Yes     Partners: Male     Birth control/protection: Surgical     Current Outpatient Medications   Medication Sig Dispense Refill    Ozempic 1 mg/dose (4 mg/3 mL) pnij INJECT 1 MG SUBCUTANEOUSLY EVERY WEEK      meloxicam (MOBIC) 15 mg tablet TAKE 1 TABLET BY MOUTH ONCE A DAY WITH FOOD 30 Tablet 1    predniSONE (DELTASONE) 20 mg tablet Take 2 tabs in AM with food for 7 days then 1 tab in AM with food until gone 28 Tablet 0    montelukast (SINGULAIR) 10 mg tablet Take 1 Tab by mouth daily. 30 Tab 2    albuterol (PROVENTIL HFA, VENTOLIN HFA, PROAIR HFA) 90 mcg/actuation inhaler Take 2 Puffs by inhalation every six (6) hours as needed for Wheezing. 3 Inhaler 3    fluticasone-salmeterol (ADVAIR DISKUS) 250-50 mcg/dose diskus inhaler Take 1 Puff by inhalation every twelve (12) hours. 3 Inhaler 3    beclomethasone (BECONASE AQ) 42 mcg (0.042 %) nasal spray 1 Puff by Both Nostrils route two (2) times a day.  30 g 3    albuterol (PROVENTIL VENTOLIN) 2.5 mg /3 mL (0.083 %) nebulizer solution 1.5 mL by Nebulization route every four (4) hours as needed for Wheezing. 24 Each 5     Past Medical History:   Diagnosis Date    Asthma     Diabetes ([facility] [address]     Fibroid, uterine      Family History   Problem Relation Age of Onset    Cancer Father     Diabetes Father     Hypertension Father     Diabetes Maternal Grandmother     Cancer Maternal Grandfather     Diabetes Paternal Grandmother     Cancer Paternal Grandfather     No Known Problems Mother     No Known Problems Brother        ROS:  No numb    Objective:  Resp 14   Ht 5' 4\" (1.626 m)   Wt 204 lb (92.5 kg)   BMI 35.02 kg/m²   NEURO:  Sensation intact light touch B/L upper extremities. right hand dominant. DTRs normal biceps and triceps   M/S:  Shoulder ROM Decreased left. Spurling's negative bilaterally  left Shoulder:  Empty can positive External rotation negative. Internal rotation negative. [place] negative. SLAP negative. Load and Shift +1 Anterior, 1 Posterior. Strength +5/5 bilaterally upper extremities. Crossover test negative. Negative atrophy bilaterally. Negative TTP at [facility] [place], operated by [facility] joint. Apprehension test negative. [place]-Kb Test positive. Yergason's test negative. Speed's test positive. Serratus anterior No  TTP. Drop arm negative  Slump negative. Standing flexion test positive right  Sphinx test positive right. ASIS low right  Iliac crests equal bilaterally Pubes equal bilaterally Medial malleolus low right  Sacral base posterior left  SOHAM low left  TTA at C3, 5 on left worse flexion, T3, 5 on right worse flexion, and L3 on left worse flexion  Rib(s) 3, 4 TTP right and posterior LE Strength +5/5 bilaterally Thoracic diaphragm restricted right. Scapula motion restricted w/ TTA left. Hip flexion limited bilaterally. Assessment/Plan:     ICD-10-CM ICD-9-CM    1. Shoulder impingement, right  M75.41 726.2 MRI SHOULDER LT WO CONT      meloxicam (MOBIC) 15 mg tablet      2.  Biceps tendinitis of left shoulder  M75.22 726.12 MRI SHOULDER LT WO CONT meloxicam (MOBIC) 15 mg tablet      3. Lumbar region somatic dysfunction  M99.03 739.3 [place] OSTEOPATHIC MANIP,9-10 BODY REGN      4. Pelvic somatic dysfunction  M99.05 739.5 [place] OSTEOPATHIC MANIP,9-10 BODY REGN      5. Sacral region somatic dysfunction  M99.04 739.4 [place] OSTEOPATHIC MANIP,9-10 BODY REGN      6. Thoracic region somatic dysfunction  M99.02 739.2 [place] OSTEOPATHIC MANIP,9-10 BODY REGN      7. Rib cage region somatic dysfunction  M99.08 739.8 [place] OSTEOPATHIC MANIP,9-10 BODY REGN      8. Cervical somatic dysfunction  M99.01 739.1 [place] OSTEOPATHIC MANIP,9-10 BODY REGN      9. Upper extremity somatic dysfunction  M99.07 739.7 [place] OSTEOPATHIC MANIP,9-10 BODY REGN      10. Lower limb region somatic dysfunction  M99.06 739.6 [place] OSTEOPATHIC MANIP,9-10 BODY REGN      11. Somatic dysfunction of abdominal region  M99.09 739.9 [address]          Patient (or guardian if minor) verbalizes understanding of evaluation and plan. Verbal consent obtained. Cervical, Thoracic, Rib, Lumbar, Pelvic, Sacral, Upper Ext, Lower Ext, and Abdominal SD treated with myofascial and ME. Correction of previous malalignments verified after Tx. Pt tolerated well. Notes improvement of Sx and pain is now rated 2/10. HEP/stretches daily. Discussed stretching/strengthening/posture. Will start HEP and mobic PRN as above and plan follow-up after MRI for results. Consider injections/PT then if indicated.

## 2022-11-18 ENCOUNTER — HOSPITAL ENCOUNTER (OUTPATIENT)
Age: 52
Discharge: HOME | End: 2022-11-18
Attending: FAMILY MEDICINE
Payer: COMMERCIAL

## 2022-11-18 PROCEDURE — 73221 MRI JOINT UPR EXTREM W/O DYE: CPT

## 2022-12-06 ENCOUNTER — OFFICE VISIT (OUTPATIENT)
Dept: ORTHOPEDIC SURGERY | Age: 52
End: 2022-12-06
Payer: COMMERCIAL

## 2022-12-06 VITALS — WEIGHT: 212 LBS | RESPIRATION RATE: 14 BRPM | HEIGHT: 64 IN | BODY MASS INDEX: 36.19 KG/M2

## 2022-12-06 DIAGNOSIS — M99.08 RIB CAGE REGION SOMATIC DYSFUNCTION: ICD-10-CM

## 2022-12-06 DIAGNOSIS — M24.112 ARTICULAR CARTILAGE DISORDER OF SHOULDER REGION, LEFT: ICD-10-CM

## 2022-12-06 DIAGNOSIS — M99.06 LOWER LIMB REGION SOMATIC DYSFUNCTION: ICD-10-CM

## 2022-12-06 DIAGNOSIS — M19.012 OSTEOARTHRITIS OF LEFT ACROMIOCLAVICULAR JOINT: ICD-10-CM

## 2022-12-06 DIAGNOSIS — M75.41 SHOULDER IMPINGEMENT, RIGHT: Primary | ICD-10-CM

## 2022-12-06 DIAGNOSIS — M99.07 UPPER EXTREMITY SOMATIC DYSFUNCTION: ICD-10-CM

## 2022-12-06 DIAGNOSIS — M99.04 SACRAL REGION SOMATIC DYSFUNCTION: ICD-10-CM

## 2022-12-06 DIAGNOSIS — M99.09 SOMATIC DYSFUNCTION OF ABDOMINAL REGION: ICD-10-CM

## 2022-12-06 DIAGNOSIS — M75.22 BICEPS TENDINITIS OF LEFT SHOULDER: ICD-10-CM

## 2022-12-06 DIAGNOSIS — M99.01 CERVICAL SOMATIC DYSFUNCTION: ICD-10-CM

## 2022-12-06 DIAGNOSIS — M99.02 THORACIC REGION SOMATIC DYSFUNCTION: ICD-10-CM

## 2022-12-06 DIAGNOSIS — M99.03 LUMBAR REGION SOMATIC DYSFUNCTION: ICD-10-CM

## 2022-12-06 DIAGNOSIS — M99.05 PELVIC SOMATIC DYSFUNCTION: ICD-10-CM

## 2022-12-06 NOTE — PROGRESS NOTES
HISTORY OF PRESENT ILLNESS    Brannon Schlatter 1970 is a 50y.o. year old female comes in today to be evaluated and treated for: left shoulder MRI    Since last appt has noticed pain improved but bad certain motions. Pain level 2/10. Using HEP and mobic with benefit. IMAGING: MRI left shoulder 2022`  IMPRESSION:  1. Moderately pronounced rotator cuff tendinosis involving distal supraspinatus  and infraspinatus. Additionally, a small interstitial tear within their  confluent attachment site.  -Accompanying reactive marrow edema within the greater tuberosity  2. Small focus of deep/full-thickness cartilage loss within the anterior  glenoid. Intact labrum. 3. Moderately pronounced AC joint arthritis with acromial downsloping and  bursitis. This constellation of findings can be predispose to extrinsic  impingement. XR left shoulder 10/4/2022  IMPRESSION  1. No acute fracture-dislocation. Past Surgical History:   Procedure Laterality Date    HX  SECTION      HX CHOLECYSTECTOMY      HX GYN  2010    essure    HX HEENT      sinus surgery    [facility] MYOMECTOMY 1-4,W/TOT 250GMS/<,ABD APPRCH  , , 2007    x3    [facility] REMOVAL GALLBLADDER  1998     Social History     Socioeconomic History    Marital status:    Tobacco Use    Smoking status: Never    Smokeless tobacco: Never   Substance and Sexual Activity    Alcohol use: No    Drug use: No    Sexual activity: Yes     Partners: Male     Birth control/protection: Surgical     Current Outpatient Medications   Medication Sig Dispense Refill    meloxicam (MOBIC) 15 mg tablet TAKE 1 TABLET BY MOUTH ONCE A DAY WITH FOOD 60 Tablet 1    Ozempic 1 mg/dose (4 mg/3 mL) pnij INJECT 1 MG SUBCUTANEOUSLY EVERY WEEK      montelukast (SINGULAIR) 10 mg tablet Take 1 Tab by mouth daily. 30 Tab 2    albuterol (PROVENTIL HFA, VENTOLIN HFA, PROAIR HFA) 90 mcg/actuation inhaler Take 2 Puffs by inhalation every six (6) hours as needed for Wheezing.  3 Inhaler 3 fluticasone-salmeterol (ADVAIR DISKUS) 250-50 mcg/dose diskus inhaler Take 1 Puff by inhalation every twelve (12) hours. 3 Inhaler 3    albuterol (PROVENTIL VENTOLIN) 2.5 mg /3 mL (0.083 %) nebulizer solution 1.5 mL by Nebulization route every four (4) hours as needed for Wheezing. 24 Each 5    predniSONE (DELTASONE) 20 mg tablet Take 2 tabs in AM with food for 7 days then 1 tab in AM with food until gone 28 Tablet 0    beclomethasone (BECONASE AQ) 42 mcg (0.042 %) nasal spray 1 Puff by Both Nostrils route two (2) times a day. 30 g 3     Past Medical History:   Diagnosis Date    Asthma     Diabetes ([facility] [address]     Fibroid, uterine      Family History   Problem Relation Age of Onset    Cancer Father     Diabetes Father     Hypertension Father     Diabetes Maternal Grandmother     Cancer Maternal Grandfather     Diabetes Paternal Grandmother     Cancer Paternal Grandfather     No Known Problems Mother     No Known Problems Brother      ROS:  No numb    Objective:  Resp 14   Ht 5' 4\" (1.626 m)   Wt 212 lb (96.2 kg)   BMI 36.39 kg/m²   NEURO:  Sensation intact light touch B/L upper extremities. right hand dominant. DTRs normal biceps and triceps   M/S:  Shoulder ROM Decreased left. Spurling's negative bilaterally  left Shoulder:  Empty can positive External rotation negative. Internal rotation negative. [place] negative. SLAP negative. Load and Shift +1 Anterior, 1 Posterior. Strength +5/5 bilaterally upper extremities. Crossover test negative. Negative atrophy bilaterally. Negative TTP at [facility] joint. Apprehension test negative. [place]-Kb Test positive. Yergason's test negative. Speed's test positive. Serratus anterior No  TTP. Drop arm negative  Slump negative. Standing flexion test positive right  Sphinx test positive right.   ASIS low right  Iliac crests equal bilaterally Pubes equal bilaterally Medial malleolus low right  Sacral base posterior left  SOHAM low left  TTA at C3, 5 on left worse flexion, T3, 5 on right worse flexion, and L3 on left worse flexion  Rib(s) 3, 4 TTP right and posterior LE Strength +5/5 bilaterally Thoracic diaphragm restricted right. Scapula motion restricted w/ TTA left. Hip flexion limited bilaterally. Assessment/Plan:     ICD-10-CM ICD-9-CM    1. Shoulder impingement, right  M75.41 726.2       2. Biceps tendinitis of left shoulder  M75.22 726.12       3. Osteoarthritis of left acromioclavicular joint  M19.012 715.91       4. Articular cartilage disorder of shoulder region, left  M24.112 718.01       5. Lumbar region somatic dysfunction  M99.03 739.3 [place] OSTEOPATHIC MANIP,9-10 BODY REGN      6. Pelvic somatic dysfunction  M99.05 739.5 [place] OSTEOPATHIC MANIP,9-10 BODY REGN      7. Sacral region somatic dysfunction  M99.04 739.4 [place] OSTEOPATHIC MANIP,9-10 BODY REGN      8. Thoracic region somatic dysfunction  M99.02 739.2 [place] OSTEOPATHIC MANIP,9-10 BODY REGN      9. Rib cage region somatic dysfunction  M99.08 739.8 [place] OSTEOPATHIC MANIP,9-10 BODY REGN      10. Cervical somatic dysfunction  M99.01 739.1 [place] OSTEOPATHIC MANIP,9-10 BODY REGN      11. Upper extremity somatic dysfunction  M99.07 739.7 [place] OSTEOPATHIC MANIP,9-10 BODY REGN      12. Lower limb region somatic dysfunction  M99.06 739.6 [place] OSTEOPATHIC MANIP,9-10 BODY REGN      13. Somatic dysfunction of abdominal region  M99.09 739.9 [place] OSTEOPATHIC MANIP,9-10 BODY REGN          Patient (or guardian if minor) verbalizes understanding of evaluation and plan. Verbal consent obtained. Cervical, Thoracic, Rib, Lumbar, Pelvic, Sacral, Upper Ext, Lower Ext, and Abdominal SD treated with myofascial and ME. Correction of previous malalignments verified after Tx. Pt tolerated well. Notes improvement of Sx and pain is now rated 1/10. HEP/stretches daily. Discussed stretching/strengthening/posture. Will continue HEP and Rx for mobic  as above and plan follow-up 1 weeks likely subacromial injection (COVID booster 12/2/2022).     Total time spent on encounter including chart/imaging/lab review and evaluation/documentation/discussion possible cortisone injections/coordination of care/form completion but not including time for any procedures/manipulation 30 minutes.

## 2022-12-06 NOTE — LETTER
12/6/2022    Patient: Kimo Fernandez   YOB: 1970   Date of Visit: 12/6/2022     [facility], [address] [place] [address] Drive [address] [place] [phone]  Via Fax: [phone]    Dear [facility],[place] [address], DO,      Thank you for referring Ms. Florentino Grove to [place] [address]. for evaluation. My notes for this consultation are attached. If you have questions, please do not hesitate to call me. I look forward to following your patient along with you.       Sincerely,    Kenneth Yusuf DO

## 2022-12-13 ENCOUNTER — OFFICE VISIT (OUTPATIENT)
Dept: ORTHOPEDIC SURGERY | Age: 52
End: 2022-12-13
Payer: COMMERCIAL

## 2022-12-13 VITALS — HEIGHT: 64 IN | RESPIRATION RATE: 14 BRPM | WEIGHT: 213 LBS | BODY MASS INDEX: 36.37 KG/M2

## 2022-12-13 DIAGNOSIS — M19.012 OSTEOARTHRITIS OF LEFT ACROMIOCLAVICULAR JOINT: ICD-10-CM

## 2022-12-13 DIAGNOSIS — M99.08 RIB CAGE REGION SOMATIC DYSFUNCTION: ICD-10-CM

## 2022-12-13 DIAGNOSIS — M99.06 LOWER LIMB REGION SOMATIC DYSFUNCTION: ICD-10-CM

## 2022-12-13 DIAGNOSIS — M99.02 THORACIC REGION SOMATIC DYSFUNCTION: ICD-10-CM

## 2022-12-13 DIAGNOSIS — M75.22 BICEPS TENDINITIS OF LEFT SHOULDER: ICD-10-CM

## 2022-12-13 DIAGNOSIS — M99.07 UPPER EXTREMITY SOMATIC DYSFUNCTION: ICD-10-CM

## 2022-12-13 DIAGNOSIS — M99.04 SACRAL REGION SOMATIC DYSFUNCTION: ICD-10-CM

## 2022-12-13 DIAGNOSIS — M99.01 CERVICAL SOMATIC DYSFUNCTION: ICD-10-CM

## 2022-12-13 DIAGNOSIS — M99.05 PELVIC SOMATIC DYSFUNCTION: ICD-10-CM

## 2022-12-13 DIAGNOSIS — M99.09 SOMATIC DYSFUNCTION OF ABDOMINAL REGION: ICD-10-CM

## 2022-12-13 DIAGNOSIS — M24.112 ARTICULAR CARTILAGE DISORDER OF SHOULDER REGION, LEFT: ICD-10-CM

## 2022-12-13 DIAGNOSIS — M75.41 SHOULDER IMPINGEMENT, RIGHT: Primary | ICD-10-CM

## 2022-12-13 DIAGNOSIS — M99.03 LUMBAR REGION SOMATIC DYSFUNCTION: ICD-10-CM

## 2022-12-13 RX ORDER — METHYLPREDNISOLONE ACETATE 40 MG/ML
40 INJECTION, SUSPENSION INTRA-ARTICULAR; INTRALESIONAL; INTRAMUSCULAR; SOFT TISSUE ONCE
Status: SHIPPED | OUTPATIENT
Start: 2022-12-13 | End: 2022-12-13

## 2022-12-13 RX ORDER — METHYLPREDNISOLONE ACETATE 40 MG/ML
40 INJECTION, SUSPENSION INTRA-ARTICULAR; INTRALESIONAL; INTRAMUSCULAR; SOFT TISSUE ONCE
Status: COMPLETED | OUTPATIENT
Start: 2022-12-13 | End: 2022-12-13

## 2022-12-13 RX ADMIN — METHYLPREDNISOLONE ACETATE 40 MG: 40 INJECTION, SUSPENSION INTRA-ARTICULAR; INTRALESIONAL; INTRAMUSCULAR; SOFT TISSUE at 10:44

## 2022-12-13 NOTE — PROGRESS NOTES
HISTORY OF PRESENT ILLNESS    Joslyn Bhatti 1970 is a 50y.o. year old female comes in today to be evaluated and treated for: left shoulder pain    Since last appt has noticed pain still significant. Pain level 3/10. Using mobic with benefit, but unable to tolerate HEP. IMAGING: MRI left shoulder 2022`  IMPRESSION:  1. Moderately pronounced rotator cuff tendinosis involving distal supraspinatus  and infraspinatus. Additionally, a small interstitial tear within their  confluent attachment site.  -Accompanying reactive marrow edema within the greater tuberosity  2. Small focus of deep/full-thickness cartilage loss within the anterior  glenoid. Intact labrum. 3. Moderately pronounced AC joint arthritis with acromial downsloping and  bursitis. This constellation of findings can be predispose to extrinsic  impingement. XR left shoulder 10/4/2022  IMPRESSION  1. No acute fracture-dislocation. Past Surgical History:   Procedure Laterality Date    HX  SECTION      HX CHOLECYSTECTOMY      HX GYN  2010    essure    HX HEENT      sinus surgery    [facility] MYOMECTOMY 1-4,W/TOT 250GMS/<,ABD APPRCH  , , 2007    x3    [place] REMOVAL GALLBLADDER  1998     Social History     Socioeconomic History    Marital status:    Tobacco Use    Smoking status: Never    Smokeless tobacco: Never   Substance and Sexual Activity    Alcohol use: No    Drug use: No    Sexual activity: Yes     Partners: Male     Birth control/protection: Surgical     Current Outpatient Medications   Medication Sig Dispense Refill    meloxicam (MOBIC) 15 mg tablet TAKE 1 TABLET BY MOUTH ONCE A DAY WITH FOOD 60 Tablet 1    Ozempic 1 mg/dose (4 mg/3 mL) pnij INJECT 1 MG SUBCUTANEOUSLY EVERY WEEK      montelukast (SINGULAIR) 10 mg tablet Take 1 Tab by mouth daily. 30 Tab 2    albuterol (PROVENTIL HFA, VENTOLIN HFA, PROAIR HFA) 90 mcg/actuation inhaler Take 2 Puffs by inhalation every six (6) hours as needed for Wheezing.  3 Inhaler 3    fluticasone-salmeterol (ADVAIR DISKUS) 250-50 mcg/dose diskus inhaler Take 1 Puff by inhalation every twelve (12) hours. 3 Inhaler 3    albuterol (PROVENTIL VENTOLIN) 2.5 mg /3 mL (0.083 %) nebulizer solution 1.5 mL by Nebulization route every four (4) hours as needed for Wheezing. 24 Each 5     Past Medical History:   Diagnosis Date    Asthma     Diabetes ([facility] [address]     Fibroid, uterine      Family History   Problem Relation Age of Onset    Cancer Father     Diabetes Father     Hypertension Father     Diabetes Maternal Grandmother     Cancer Maternal Grandfather     Diabetes Paternal Grandmother     Cancer Paternal Grandfather     No Known Problems Mother     No Known Problems Brother          ROS:  No numb    Objective:  Resp 14   Ht 5' 4\" (1.626 m)   Wt 213 lb (96.6 kg)   BMI 36.56 kg/m²   NEURO:  Sensation intact light touch B/L upper extremities. right hand dominant. DTRs normal biceps and triceps   M/S:  Shoulder ROM Decreased left. Spurling's negative bilaterally  left Shoulder:  Empty can positive External rotation negative. Internal rotation negative. [place] negative. SLAP negative. Load and Shift +1 Anterior, 1 Posterior. Strength +5/5 bilaterally upper extremities. Crossover test negative. Negative atrophy bilaterally. Negative TTP at [facility] joint. Apprehension test negative. [place]le[place]-Kb Test positive. Yergason's test negative. Speed's test positive. Serratus anterior No  TTP. Drop arm negative  Slump negative. Standing flexion test positive right  Sphinx test positive right. ASIS low right  Iliac crests equal bilaterally Pubes equal bilaterally Medial malleolus low right  Sacral base posterior left  SOHAM low left  TTA at C3, 5 on left worse flexion, T3, 5 on right worse flexion, and L3 on left worse flexion  Rib(s) 3, 4 TTP right and posterior LE Strength +5/5 bilaterally Thoracic diaphragm restricted right. Scapula motion restricted w/ TTA left.  Hip flexion limited bilaterally. Assessment/Plan:     ICD-10-CM ICD-9-CM    1. Shoulder impingement, right  M75.41 726.2 DRAIN/INJECT LARGE JOINT/BURSA      2. Biceps tendinitis of left shoulder  M75.22 726.12       3. Osteoarthritis of left acromioclavicular joint  M19.012 715.91       4. Articular cartilage disorder of shoulder region, left  M24.112 718.01       5. Lumbar region somatic dysfunction  M99.03 739.3 [place] OSTEOPATHIC MANIP,9-10 BODY REGN      6. Pelvic somatic dysfunction  M99.05 739.5 [place] OSTEOPATHIC MANIP,9-10 BODY REGN      7. Sacral region somatic dysfunction  M99.04 739.4 [place] OSTEOPATHIC MANIP,9-10 BODY REGN      8. Thoracic region somatic dysfunction  M99.02 739.2 [place] OSTEOPATHIC MANIP,9-10 BODY REGN      9. Rib cage region somatic dysfunction  M99.08 739.8 [place] OSTEOPATHIC MANIP,9-10 BODY REGN      10. Cervical somatic dysfunction  M99.01 739.1 [place] OSTEOPATHIC MANIP,9-10 BODY REGN      11. Upper extremity somatic dysfunction  M99.07 739.7 [place] OSTEOPATHIC MANIP,9-10 BODY REGN      12. Lower limb region somatic dysfunction  M99.06 739.6 [place] OSTEOPATHIC MANIP,9-10 BODY REGN      13. Somatic dysfunction of abdominal region  M99.09 739.9 [place] OSTEOPATHIC MANIP,9-10 BODY REGN        Patient (or guardian if minor) verbalizes understanding of evaluation and plan. Verbal consent obtained. Cervical, Thoracic, Rib, Lumbar, Pelvic, Sacral, Upper Ext, Lower Ext, and Abdominal SD treated with myofascial and ME. Correction of previous malalignments verified after Tx. Pt tolerated well. Notes improvement of Sx and pain is now rated 0/10. HEP/stretches daily. Discussed stretching/strengthening/posture. Will start HEP after injected today w/ mobic from prior and bicep stretch as above and plan follow-up 6 weeks.

## 2022-12-13 NOTE — LETTER
Name:  Heather Garcia   :  1970  MR#:  [id number]   [address] / PROCEDURE   1. I (we), _____Krishna Billingsley Jabari Philippe__________ authorize Mark Johnson DO, FAOASM                       (Patient Name)     (Provider / Proceduralist)   and/or such assistants as may be selected by him/her, to perform the following operation/procedures   _______________inject steroid into left shoulder subacromial_____________________  _________________________________________________________________________  Note: If unable to obtain consent prior to an emergent procedure, document the emergent reason in the medical record. This procedure has been explained to my (our) satisfaction and included in the explanation was:   A) the intended benefit, nature, and extent of the procedure to be performed;   B) the significant risks involved and the probability of success;   C) alternative procedures and methods of treatment;   D) the dangers and probable consequences of such alternatives (including no procedure or treatment); E) the expected consequences of the procedure on my future health;   F) whether other qualified individuals would be performing important surgical tasks and / or whether  would be present to advise or support the procedure. I (we) understand that there are other risks of infection and other serious complications in the pre-operative/procedural and postoperative/procedural stages of my (our) care. I (we) have asked all of the questions which I (we) thought were important in deciding whether or not to undergo treatment or diagnosis. These questions have been answered to my (our) satisfaction. I (we) understand that no assurance can be given that the procedure will be a success, and no guarantee or warranty of success has been given to me (us).    2. It has been explained to me (us) that during the course of the operation/procedure, unforeseen conditions may be revealed that necessitate extension of the original procedure(s) or different procedure(s) than those set forth in Paragraph 1. I (we) authorize and request that the above-named physician, his/her assistants or his/her designees, perform procedures as necessary and desirable if deemed to be in my (our) best interest.     3. I acknowledge that other health care personnel may be observing this procedure for the purpose of medical education or other specified purposes as may be necessary as requested and/or approved by my (our) physician. 4. I (we) consent to the disposal by the hospital Pathologist of the removed tissue, parts or organs in accordance with hospital policy. Page 1 of 2          Name:  Axel Corona         :  1970         MR#:  [id number]      2. I do__x__ do not____ consent to the use of a local infiltration pain blocking agent that will be used by my provider/surgical provider to help alleviate pain during my procedure. 6. I do___ do not__X__ consent to an emergent blood transfusion in the case of a life-threatening situation that requires blood components to be administered. This consent is valid for 24 hours from the beginning of the procedure. 7. This patient does ___ or does not __X__currently have a DNR status/order. If DNR order is in place, obtain \"Addendum to the Surgical Consent for ALL Patients with a DNR Order\" to address darius-operative status for limited intervention or DNR suspension. 8. I have read and fully understand the above Consent for Operation/Procedure and that all blanks were completed before I signed the consent.    X____________________________________ _____Krishna Addy Philippe_________   Date: 2022/_______am/pm   Signature of Patient or legal representative.    ________________________ ______Barbara Hernandez, EVERT____Naya Rm LPN___  Date: /_______ am/pm   Witness to Signature Printed Name Date / Time    (If patient is unable to sign or is a minor, complete the following)     Patient is a minor, ____years of age, or unable to sign because:   _________________________________________________________________________  Dali Yin If a phone consent is obtained, consent will be documented by using two health care professionals, each affirming that the consenting party has no questions and gives consent for the procedure discussed with the physician/provider.     _________________________________ _______________________________   Date: ______/_____am/pm   2nd witness to phone consent Printed name Date / Time   Informed consent:   I have provided the explanation described above in section 1 to the patient and/or legal representative. I have provided the patient and/or legal representative with an opportunity to ask any questions about the proposed operation/procedure. _                       _ __EPHRAIM Bee____ 12/13/2022/_______ am/pm   Provider / Proceduralist Printed Name Date / Time   This Provider / Proceduralist performing the surgery is ONLY for Office-based procedures in [place]   [x] Board certified or Board eligible by one of the [employer] [place], the [employer] [place] [employer], the [employer] of Podiatric Medicine, the [employer] of Foot and Ankle Surgery, or other board as approved by the hospital for medical staff appointment.    Revised 8/2/2021 Page 2 of 2

## 2022-12-13 NOTE — PATIENT INSTRUCTIONS
Search YouTube for my channel:    Dr. Yaneth Campoverde cuff    Biceps stretch  Stand and hold your affected arm out to the side, with your hand at about hip level. Gently bend your wrist back so that your fingers point down toward the floor. You may also do this next to a wall and rest your fingers on the wall. For more of a stretch, bend your head to the opposite side of your affected arm. Hold for 15 to 30 seconds. Repeat 2 to 4 times.

## 2022-12-13 NOTE — LETTER
12/13/2022    Patient: Heather Garcia   YOB: 1970   Date of Visit: 12/13/2022     [facility],Building [facility], [address] [place] [address] Drive [address] [place] [id number]  Via Fax: [phone]    Dear [facility],[place] [address], DO,      Thank you for referring Ms. Dione Brady to [place] [id number]. for evaluation. My notes for this consultation are attached. If you have questions, please do not hesitate to call me. I look forward to following your patient along with you.       Sincerely,    Lebron Valencia, DO

## 2022-12-13 NOTE — PROCEDURES
PROCEDURE NOTE:  Time out: 12am  * Patient was identified by name and date of birth   * Agreement on procedure being performed was verified  * Risks and Benefits explained to the patient  * Procedure site verified and marked as necessary  * Patient was positioned for comfort  * Consent was signed and verified. Risks/benefits including but not limited to bleeding, infection, and scarring discussed and Pt wishes to proceed with procedure. The area was prepped with betadine. Ethyl chloride spray was used, under sterile technique and without ultrasound guidance  1cc of 40mg/cc methylprednisolone acetate and 2cc mepivacaine were injected into left shoulder subacromial bursa. Sterile gauze used to clean the area. Blood loss minimal.  Noticed improvement in pain Sx within 5 minutes (now rated 1/10). Tolerated procedure well. Discussed possible signs/Sx of infxn, and advised to seek care if concerned. Ultrasound images (if applicable) digitally attached to CPT order in patients chart.

## 2023-06-15 ENCOUNTER — HOSPITAL ENCOUNTER (OUTPATIENT)
Facility: HOSPITAL | Age: 53
Discharge: HOME | End: 2023-06-18
Payer: COMMERCIAL

## 2023-06-15 DIAGNOSIS — Z12.31 VISIT FOR SCREENING MAMMOGRAM: ICD-10-CM

## 2023-06-15 PROCEDURE — 77063 BREAST TOMOSYNTHESIS BI: CPT

## 2024-06-14 ENCOUNTER — HOSPITAL ENCOUNTER (OUTPATIENT)
Facility: HOSPITAL | Age: 54
Discharge: HOME | End: 2024-06-14
Payer: COMMERCIAL

## 2024-06-14 VITALS — HEIGHT: 64 IN | BODY MASS INDEX: 36.36 KG/M2 | WEIGHT: 212.96 LBS

## 2024-06-14 DIAGNOSIS — Z12.31 SCREENING MAMMOGRAM FOR BREAST CANCER: ICD-10-CM

## 2024-06-14 PROCEDURE — 77063 BREAST TOMOSYNTHESIS BI: CPT

## 2024-09-16 RX ORDER — MELOXICAM 15 MG/1
TABLET ORAL
Qty: 90 TABLET | Refills: 1 | OUTPATIENT
Start: 2024-09-16

## 2025-05-27 ENCOUNTER — TRANSCRIBE ORDERS (OUTPATIENT)
Facility: HOSPITAL | Age: 55
End: 2025-05-27

## 2025-05-27 DIAGNOSIS — Z12.31 VISIT FOR SCREENING MAMMOGRAM: Primary | ICD-10-CM

## 2025-06-16 ENCOUNTER — HOSPITAL ENCOUNTER (OUTPATIENT)
Facility: HOSPITAL | Age: 55
Discharge: HOME | End: 2025-06-19
Payer: COMMERCIAL

## 2025-06-16 VITALS — HEIGHT: 64 IN | BODY MASS INDEX: 36.7 KG/M2 | WEIGHT: 215 LBS

## 2025-06-16 DIAGNOSIS — Z12.31 VISIT FOR SCREENING MAMMOGRAM: ICD-10-CM

## 2025-06-16 PROCEDURE — 77067 SCR MAMMO BI INCL CAD: CPT
